# Patient Record
Sex: MALE | Race: WHITE | Employment: OTHER | URBAN - METROPOLITAN AREA
[De-identification: names, ages, dates, MRNs, and addresses within clinical notes are randomized per-mention and may not be internally consistent; named-entity substitution may affect disease eponyms.]

---

## 2017-03-22 PROBLEM — E07.9 THYROID DISEASE: Status: ACTIVE | Noted: 2017-03-22

## 2017-03-22 PROBLEM — K21.9 GERD (GASTROESOPHAGEAL REFLUX DISEASE): Status: ACTIVE | Noted: 2017-03-22

## 2017-03-22 PROBLEM — G43.909 MIGRAINES: Status: ACTIVE | Noted: 2017-03-22

## 2017-03-22 PROBLEM — F32.A DEPRESSION: Status: ACTIVE | Noted: 2017-03-22

## 2017-03-22 PROBLEM — F40.240 CLAUSTROPHOBIA: Status: ACTIVE | Noted: 2017-03-22

## 2017-03-22 PROBLEM — E78.00 HIGH CHOLESTEROL: Status: ACTIVE | Noted: 2017-03-22

## 2020-01-27 ENCOUNTER — HOSPITAL ENCOUNTER (INPATIENT)
Age: 50
LOS: 3 days | Discharge: HOME | DRG: 378 | End: 2020-01-30
Attending: EMERGENCY MEDICINE | Admitting: FAMILY MEDICINE
Payer: COMMERCIAL

## 2020-01-27 DIAGNOSIS — K92.2 ACUTE UPPER GI BLEEDING: Primary | ICD-10-CM

## 2020-01-27 PROBLEM — D64.9 ANEMIA: Status: ACTIVE | Noted: 2020-01-27

## 2020-01-27 PROBLEM — K92.1 MELENA: Status: ACTIVE | Noted: 2020-01-27

## 2020-01-27 LAB
ALBUMIN SERPL-MCNC: 3.3 G/DL (ref 3.5–5)
ALBUMIN/GLOB SERPL: 1.1 {RATIO} (ref 1.2–3.5)
ALP SERPL-CCNC: 109 U/L (ref 50–136)
ALT SERPL-CCNC: 37 U/L (ref 12–65)
ANION GAP SERPL CALC-SCNC: 5 MMOL/L (ref 7–16)
AST SERPL-CCNC: 37 U/L (ref 15–37)
BASOPHILS # BLD: 0.1 K/UL (ref 0–0.2)
BASOPHILS NFR BLD: 1 % (ref 0–2)
BILIRUB SERPL-MCNC: 0.5 MG/DL (ref 0.2–1.1)
BUN SERPL-MCNC: 14 MG/DL (ref 6–23)
CALCIUM SERPL-MCNC: 8.5 MG/DL (ref 8.3–10.4)
CHLORIDE SERPL-SCNC: 109 MMOL/L (ref 98–107)
CO2 SERPL-SCNC: 27 MMOL/L (ref 21–32)
CREAT SERPL-MCNC: 1.06 MG/DL (ref 0.8–1.5)
DIFFERENTIAL METHOD BLD: ABNORMAL
EOSINOPHIL # BLD: 0.2 K/UL (ref 0–0.8)
EOSINOPHIL NFR BLD: 3 % (ref 0.5–7.8)
ERYTHROCYTE [DISTWIDTH] IN BLOOD BY AUTOMATED COUNT: 17.2 % (ref 11.9–14.6)
GLOBULIN SER CALC-MCNC: 2.9 G/DL (ref 2.3–3.5)
GLUCOSE SERPL-MCNC: 86 MG/DL (ref 65–100)
HCT VFR BLD AUTO: 25.7 % (ref 41.1–50.3)
HGB BLD-MCNC: 7.8 G/DL (ref 13.6–17.2)
IMM GRANULOCYTES # BLD AUTO: 0 K/UL (ref 0–0.5)
IMM GRANULOCYTES NFR BLD AUTO: 0 % (ref 0–5)
LIPASE SERPL-CCNC: 94 U/L (ref 73–393)
LYMPHOCYTES # BLD: 2.8 K/UL (ref 0.5–4.6)
LYMPHOCYTES NFR BLD: 35 % (ref 13–44)
MCH RBC QN AUTO: 26.6 PG (ref 26.1–32.9)
MCHC RBC AUTO-ENTMCNC: 30.4 G/DL (ref 31.4–35)
MCV RBC AUTO: 87.7 FL (ref 79.6–97.8)
MONOCYTES # BLD: 0.6 K/UL (ref 0.1–1.3)
MONOCYTES NFR BLD: 7 % (ref 4–12)
NEUTS SEG # BLD: 4.3 K/UL (ref 1.7–8.2)
NEUTS SEG NFR BLD: 54 % (ref 43–78)
NRBC # BLD: 0 K/UL (ref 0–0.2)
PLATELET # BLD AUTO: 255 K/UL (ref 150–450)
PMV BLD AUTO: 11 FL (ref 9.4–12.3)
POTASSIUM SERPL-SCNC: 3.9 MMOL/L (ref 3.5–5.1)
PROT SERPL-MCNC: 6.2 G/DL (ref 6.3–8.2)
RBC # BLD AUTO: 2.93 M/UL (ref 4.23–5.6)
SODIUM SERPL-SCNC: 141 MMOL/L (ref 136–145)
WBC # BLD AUTO: 8 K/UL (ref 4.3–11.1)

## 2020-01-27 PROCEDURE — 80053 COMPREHEN METABOLIC PANEL: CPT

## 2020-01-27 PROCEDURE — 74011250636 HC RX REV CODE- 250/636: Performed by: EMERGENCY MEDICINE

## 2020-01-27 PROCEDURE — C9113 INJ PANTOPRAZOLE SODIUM, VIA: HCPCS | Performed by: EMERGENCY MEDICINE

## 2020-01-27 PROCEDURE — P9016 RBC LEUKOCYTES REDUCED: HCPCS

## 2020-01-27 PROCEDURE — 86900 BLOOD TYPING SEROLOGIC ABO: CPT

## 2020-01-27 PROCEDURE — 99218 HC RM OBSERVATION: CPT

## 2020-01-27 PROCEDURE — 65270000029 HC RM PRIVATE

## 2020-01-27 PROCEDURE — 83690 ASSAY OF LIPASE: CPT

## 2020-01-27 PROCEDURE — 99284 EMERGENCY DEPT VISIT MOD MDM: CPT

## 2020-01-27 PROCEDURE — 74011250636 HC RX REV CODE- 250/636: Performed by: FAMILY MEDICINE

## 2020-01-27 PROCEDURE — 86923 COMPATIBILITY TEST ELECTRIC: CPT

## 2020-01-27 PROCEDURE — 85025 COMPLETE CBC W/AUTO DIFF WBC: CPT

## 2020-01-27 PROCEDURE — 74011000250 HC RX REV CODE- 250: Performed by: EMERGENCY MEDICINE

## 2020-01-27 PROCEDURE — 30233N1 TRANSFUSION OF NONAUTOLOGOUS RED BLOOD CELLS INTO PERIPHERAL VEIN, PERCUTANEOUS APPROACH: ICD-10-PCS | Performed by: EMERGENCY MEDICINE

## 2020-01-27 PROCEDURE — 36430 TRANSFUSION BLD/BLD COMPNT: CPT

## 2020-01-27 PROCEDURE — 96374 THER/PROPH/DIAG INJ IV PUSH: CPT

## 2020-01-27 RX ORDER — ONDANSETRON 2 MG/ML
4 INJECTION INTRAMUSCULAR; INTRAVENOUS
Status: DISCONTINUED | OUTPATIENT
Start: 2020-01-27 | End: 2020-01-30 | Stop reason: HOSPADM

## 2020-01-27 RX ORDER — EPINEPHRINE 0.15 MG/.3ML
1 INJECTION INTRAMUSCULAR
COMMUNITY
End: 2020-01-30

## 2020-01-27 RX ORDER — HYDROCODONE BITARTRATE AND ACETAMINOPHEN 10; 325 MG/1; MG/1
1 TABLET ORAL
Status: DISCONTINUED | OUTPATIENT
Start: 2020-01-27 | End: 2020-01-30 | Stop reason: HOSPADM

## 2020-01-27 RX ORDER — PANTOPRAZOLE SODIUM 40 MG/1
40 TABLET, DELAYED RELEASE ORAL
COMMUNITY
Start: 2019-12-31 | End: 2020-01-30

## 2020-01-27 RX ORDER — SODIUM CHLORIDE 9 MG/ML
75 INJECTION, SOLUTION INTRAVENOUS CONTINUOUS
Status: DISCONTINUED | OUTPATIENT
Start: 2020-01-28 | End: 2020-01-30 | Stop reason: HOSPADM

## 2020-01-27 RX ORDER — LEVOTHYROXINE SODIUM 150 UG/1
150 TABLET ORAL
COMMUNITY
Start: 2019-10-31 | End: 2020-04-24 | Stop reason: CLARIF

## 2020-01-27 RX ORDER — SODIUM CHLORIDE 0.9 % (FLUSH) 0.9 %
5-40 SYRINGE (ML) INJECTION EVERY 8 HOURS
Status: DISCONTINUED | OUTPATIENT
Start: 2020-01-28 | End: 2020-01-27

## 2020-01-27 RX ORDER — TESTOSTERONE CYPIONATE 200 MG/ML
200 INJECTION INTRAMUSCULAR
COMMUNITY
End: 2020-01-30

## 2020-01-27 RX ORDER — ALBUTEROL SULFATE 90 UG/1
2 AEROSOL, METERED RESPIRATORY (INHALATION)
COMMUNITY
End: 2020-01-30

## 2020-01-27 RX ORDER — CYCLOBENZAPRINE HCL 10 MG
10 TABLET ORAL
COMMUNITY
End: 2020-01-30

## 2020-01-27 RX ORDER — CLONAZEPAM 0.5 MG/1
0.5 TABLET ORAL DAILY PRN
Status: DISCONTINUED | OUTPATIENT
Start: 2020-01-27 | End: 2020-01-30 | Stop reason: HOSPADM

## 2020-01-27 RX ORDER — CLONAZEPAM 0.5 MG/1
0.5 TABLET ORAL AS NEEDED
COMMUNITY

## 2020-01-27 RX ORDER — SODIUM CHLORIDE 0.9 % (FLUSH) 0.9 %
5-40 SYRINGE (ML) INJECTION AS NEEDED
Status: DISCONTINUED | OUTPATIENT
Start: 2020-01-27 | End: 2020-01-30 | Stop reason: HOSPADM

## 2020-01-27 RX ORDER — HYDROCODONE BITARTRATE AND ACETAMINOPHEN 10; 325 MG/1; MG/1
1 TABLET ORAL
COMMUNITY
Start: 2019-10-31 | End: 2020-01-30

## 2020-01-27 RX ORDER — SODIUM CHLORIDE 9 MG/ML
250 INJECTION, SOLUTION INTRAVENOUS AS NEEDED
Status: DISCONTINUED | OUTPATIENT
Start: 2020-01-27 | End: 2020-01-30 | Stop reason: HOSPADM

## 2020-01-27 RX ADMIN — SODIUM CHLORIDE 100 ML/HR: 900 INJECTION, SOLUTION INTRAVENOUS at 23:48

## 2020-01-27 RX ADMIN — SODIUM CHLORIDE 1000 ML: 900 INJECTION, SOLUTION INTRAVENOUS at 21:10

## 2020-01-27 RX ADMIN — SODIUM CHLORIDE 40 MG: 9 INJECTION, SOLUTION INTRAMUSCULAR; INTRAVENOUS; SUBCUTANEOUS at 20:43

## 2020-01-27 NOTE — LETTER
NOTIFICATION RETURN TO WORK / SCHOOL 
 
1/28/2020 12:45 PM 
 
Mr. Raelyn Seip [address] [facility] [address] To Whom It May Concern: 
 
Raelyn Seip is currently under the care of ANNI Stokes. He will return to work/school on: Undetermined at this time pending test results and treatments if needed. Admitted on 1/27/2020 If there are questions or concerns please have the patient contact our unit Sincerely, Kristie Tovar RN Case Manager

## 2020-01-27 NOTE — ED TRIAGE NOTES
Patient sent from PCP for low hemoglobin of 8.2. Patient states that has received 8 pints of blood this month. Patient is pale in triage. Reports SOB and \"slight chest pain. \"

## 2020-01-28 ENCOUNTER — ANESTHESIA EVENT (OUTPATIENT)
Dept: ENDOSCOPY | Age: 50
DRG: 378 | End: 2020-01-28
Payer: COMMERCIAL

## 2020-01-28 ENCOUNTER — ANESTHESIA (OUTPATIENT)
Dept: ENDOSCOPY | Age: 50
DRG: 378 | End: 2020-01-28
Payer: COMMERCIAL

## 2020-01-28 LAB
ANION GAP SERPL CALC-SCNC: 5 MMOL/L (ref 7–16)
BASOPHILS # BLD: 0 K/UL (ref 0–0.2)
BASOPHILS # BLD: 0 K/UL (ref 0–0.2)
BASOPHILS NFR BLD: 1 % (ref 0–2)
BASOPHILS NFR BLD: 1 % (ref 0–2)
BUN SERPL-MCNC: 14 MG/DL (ref 6–23)
CALCIUM SERPL-MCNC: 8 MG/DL (ref 8.3–10.4)
CHLORIDE SERPL-SCNC: 113 MMOL/L (ref 98–107)
CO2 SERPL-SCNC: 26 MMOL/L (ref 21–32)
CREAT SERPL-MCNC: 0.95 MG/DL (ref 0.8–1.5)
DIFFERENTIAL METHOD BLD: ABNORMAL
DIFFERENTIAL METHOD BLD: ABNORMAL
EOSINOPHIL # BLD: 0.2 K/UL (ref 0–0.8)
EOSINOPHIL # BLD: 0.2 K/UL (ref 0–0.8)
EOSINOPHIL NFR BLD: 3 % (ref 0.5–7.8)
EOSINOPHIL NFR BLD: 4 % (ref 0.5–7.8)
ERYTHROCYTE [DISTWIDTH] IN BLOOD BY AUTOMATED COUNT: 16.5 % (ref 11.9–14.6)
ERYTHROCYTE [DISTWIDTH] IN BLOOD BY AUTOMATED COUNT: 16.8 % (ref 11.9–14.6)
GLUCOSE SERPL-MCNC: 120 MG/DL (ref 65–100)
HCT VFR BLD AUTO: 24.5 % (ref 41.1–50.3)
HCT VFR BLD AUTO: 27 % (ref 41.1–50.3)
HGB BLD-MCNC: 7.5 G/DL (ref 13.6–17.2)
HGB BLD-MCNC: 8.2 G/DL (ref 13.6–17.2)
IMM GRANULOCYTES # BLD AUTO: 0 K/UL (ref 0–0.5)
IMM GRANULOCYTES # BLD AUTO: 0 K/UL (ref 0–0.5)
IMM GRANULOCYTES NFR BLD AUTO: 0 % (ref 0–5)
IMM GRANULOCYTES NFR BLD AUTO: 0 % (ref 0–5)
INR PPP: 1
LYMPHOCYTES # BLD: 1.8 K/UL (ref 0.5–4.6)
LYMPHOCYTES # BLD: 2.2 K/UL (ref 0.5–4.6)
LYMPHOCYTES NFR BLD: 40 % (ref 13–44)
LYMPHOCYTES NFR BLD: 41 % (ref 13–44)
MCH RBC QN AUTO: 26.5 PG (ref 26.1–32.9)
MCH RBC QN AUTO: 26.6 PG (ref 26.1–32.9)
MCHC RBC AUTO-ENTMCNC: 30.4 G/DL (ref 31.4–35)
MCHC RBC AUTO-ENTMCNC: 30.6 G/DL (ref 31.4–35)
MCV RBC AUTO: 86.6 FL (ref 79.6–97.8)
MCV RBC AUTO: 87.7 FL (ref 79.6–97.8)
MONOCYTES # BLD: 0.3 K/UL (ref 0.1–1.3)
MONOCYTES # BLD: 0.4 K/UL (ref 0.1–1.3)
MONOCYTES NFR BLD: 6 % (ref 4–12)
MONOCYTES NFR BLD: 7 % (ref 4–12)
NEUTS SEG # BLD: 2.3 K/UL (ref 1.7–8.2)
NEUTS SEG # BLD: 2.6 K/UL (ref 1.7–8.2)
NEUTS SEG NFR BLD: 48 % (ref 43–78)
NEUTS SEG NFR BLD: 50 % (ref 43–78)
NRBC # BLD: 0 K/UL (ref 0–0.2)
NRBC # BLD: 0 K/UL (ref 0–0.2)
PLATELET # BLD AUTO: 212 K/UL (ref 150–450)
PLATELET # BLD AUTO: 227 K/UL (ref 150–450)
PMV BLD AUTO: 10.4 FL (ref 9.4–12.3)
PMV BLD AUTO: 10.5 FL (ref 9.4–12.3)
POTASSIUM SERPL-SCNC: 3.9 MMOL/L (ref 3.5–5.1)
PROTHROMBIN TIME: 13.4 SEC (ref 11.7–14.5)
RBC # BLD AUTO: 2.83 M/UL (ref 4.23–5.6)
RBC # BLD AUTO: 3.08 M/UL (ref 4.23–5.6)
SODIUM SERPL-SCNC: 144 MMOL/L (ref 136–145)
WBC # BLD AUTO: 4.6 K/UL (ref 4.3–11.1)
WBC # BLD AUTO: 5.5 K/UL (ref 4.3–11.1)

## 2020-01-28 PROCEDURE — 65270000029 HC RM PRIVATE

## 2020-01-28 PROCEDURE — 36415 COLL VENOUS BLD VENIPUNCTURE: CPT

## 2020-01-28 PROCEDURE — 77030021593 HC FCPS BIOP ENDOSC BSC -A: Performed by: INTERNAL MEDICINE

## 2020-01-28 PROCEDURE — 88305 TISSUE EXAM BY PATHOLOGIST: CPT

## 2020-01-28 PROCEDURE — 85610 PROTHROMBIN TIME: CPT

## 2020-01-28 PROCEDURE — 80048 BASIC METABOLIC PNL TOTAL CA: CPT

## 2020-01-28 PROCEDURE — 0DB98ZX EXCISION OF DUODENUM, VIA NATURAL OR ARTIFICIAL OPENING ENDOSCOPIC, DIAGNOSTIC: ICD-10-PCS | Performed by: INTERNAL MEDICINE

## 2020-01-28 PROCEDURE — 74011250637 HC RX REV CODE- 250/637: Performed by: FAMILY MEDICINE

## 2020-01-28 PROCEDURE — 99218 HC RM OBSERVATION: CPT

## 2020-01-28 PROCEDURE — 76060000031 HC ANESTHESIA FIRST 0.5 HR: Performed by: INTERNAL MEDICINE

## 2020-01-28 PROCEDURE — 88312 SPECIAL STAINS GROUP 1: CPT

## 2020-01-28 PROCEDURE — 74011250636 HC RX REV CODE- 250/636: Performed by: INTERNAL MEDICINE

## 2020-01-28 PROCEDURE — C9113 INJ PANTOPRAZOLE SODIUM, VIA: HCPCS | Performed by: FAMILY MEDICINE

## 2020-01-28 PROCEDURE — 0DB68ZX EXCISION OF STOMACH, VIA NATURAL OR ARTIFICIAL OPENING ENDOSCOPIC, DIAGNOSTIC: ICD-10-PCS | Performed by: INTERNAL MEDICINE

## 2020-01-28 PROCEDURE — 77030022875 HC PRB AR PLSM COAG ERBE -C: Performed by: INTERNAL MEDICINE

## 2020-01-28 PROCEDURE — 76040000025: Performed by: INTERNAL MEDICINE

## 2020-01-28 PROCEDURE — 74011000250 HC RX REV CODE- 250: Performed by: NURSE ANESTHETIST, CERTIFIED REGISTERED

## 2020-01-28 PROCEDURE — 74011250637 HC RX REV CODE- 250/637: Performed by: INTERNAL MEDICINE

## 2020-01-28 PROCEDURE — 74011250636 HC RX REV CODE- 250/636: Performed by: NURSE ANESTHETIST, CERTIFIED REGISTERED

## 2020-01-28 PROCEDURE — 85025 COMPLETE CBC W/AUTO DIFF WBC: CPT

## 2020-01-28 PROCEDURE — 74011250636 HC RX REV CODE- 250/636: Performed by: FAMILY MEDICINE

## 2020-01-28 PROCEDURE — 74011000250 HC RX REV CODE- 250: Performed by: FAMILY MEDICINE

## 2020-01-28 RX ORDER — PROPOFOL 10 MG/ML
INJECTION, EMULSION INTRAVENOUS
Status: DISCONTINUED | OUTPATIENT
Start: 2020-01-28 | End: 2020-01-28 | Stop reason: HOSPADM

## 2020-01-28 RX ORDER — PANTOPRAZOLE SODIUM 40 MG/1
40 TABLET, DELAYED RELEASE ORAL
Status: DISCONTINUED | OUTPATIENT
Start: 2020-01-28 | End: 2020-01-30 | Stop reason: HOSPADM

## 2020-01-28 RX ORDER — SODIUM CHLORIDE, SODIUM LACTATE, POTASSIUM CHLORIDE, CALCIUM CHLORIDE 600; 310; 30; 20 MG/100ML; MG/100ML; MG/100ML; MG/100ML
1000 INJECTION, SOLUTION INTRAVENOUS CONTINUOUS
Status: DISCONTINUED | OUTPATIENT
Start: 2020-01-28 | End: 2020-01-28 | Stop reason: HOSPADM

## 2020-01-28 RX ORDER — LIDOCAINE HYDROCHLORIDE 20 MG/ML
INJECTION, SOLUTION EPIDURAL; INFILTRATION; INTRACAUDAL; PERINEURAL AS NEEDED
Status: DISCONTINUED | OUTPATIENT
Start: 2020-01-28 | End: 2020-01-28 | Stop reason: HOSPADM

## 2020-01-28 RX ORDER — SUCRALFATE 1 G/1
1 TABLET ORAL
Status: DISCONTINUED | OUTPATIENT
Start: 2020-01-28 | End: 2020-01-30 | Stop reason: HOSPADM

## 2020-01-28 RX ORDER — PROPOFOL 10 MG/ML
INJECTION, EMULSION INTRAVENOUS AS NEEDED
Status: DISCONTINUED | OUTPATIENT
Start: 2020-01-28 | End: 2020-01-28 | Stop reason: HOSPADM

## 2020-01-28 RX ADMIN — PROPOFOL 200 MCG/KG/MIN: 10 INJECTION, EMULSION INTRAVENOUS at 10:35

## 2020-01-28 RX ADMIN — HYDROCODONE BITARTRATE AND ACETAMINOPHEN 1 TABLET: 10; 325 TABLET ORAL at 23:17

## 2020-01-28 RX ADMIN — LIDOCAINE HYDROCHLORIDE 100 MG: 20 INJECTION, SOLUTION EPIDURAL; INFILTRATION; INTRACAUDAL; PERINEURAL at 10:35

## 2020-01-28 RX ADMIN — SUCRALFATE 1 G: 1 TABLET ORAL at 17:10

## 2020-01-28 RX ADMIN — SODIUM CHLORIDE 40 MG: 9 INJECTION, SOLUTION INTRAMUSCULAR; INTRAVENOUS; SUBCUTANEOUS at 09:04

## 2020-01-28 RX ADMIN — PANTOPRAZOLE SODIUM 40 MG: 40 TABLET, DELAYED RELEASE ORAL at 17:10

## 2020-01-28 RX ADMIN — SUCRALFATE 1 G: 1 TABLET ORAL at 21:14

## 2020-01-28 RX ADMIN — SODIUM CHLORIDE, SODIUM LACTATE, POTASSIUM CHLORIDE, AND CALCIUM CHLORIDE 1000 ML: 600; 310; 30; 20 INJECTION, SOLUTION INTRAVENOUS at 10:15

## 2020-01-28 RX ADMIN — PROPOFOL 70 MG: 10 INJECTION, EMULSION INTRAVENOUS at 10:35

## 2020-01-28 RX ADMIN — ONDANSETRON 4 MG: 2 INJECTION INTRAMUSCULAR; INTRAVENOUS at 21:14

## 2020-01-28 NOTE — H&P (VIEW-ONLY)
[facility] Consult Note Referring Provider:  Lyndon Douglas 
 
Consult Date:  1/27/2020 Admit Date:  1/27/2020 Chief Complaint: Anemia Subjective:  
 
History of Present Illness:  Patient is a 52 y.o. male with PMH of Depression, chronic pain, who is seen in consultation at the request of Dr. Laura Dobbins  for anemia. He was placed on high dose NSAIDs in November for presumed LLQ cellulitis. He presented to the [facility] ER on 12/30 for weakness and was found to have a decreased hgb. He underwnet EGD and was told he had [employer]"  He has since been admitted an additional time for anemia. He was last transfused 2u PRBC 3 days ago. He followed up with the surgeon who did his endoscopies today. He directed him to [employer] \"since there was nothing more he could do\". The patient endorses 2 dark tarry stools daily. There has been no BRB. There is diffuse epigastric pain. He has not used NSAIDs since his initial endoscopy. PMH: 
Past Medical History:  
Diagnosis Date  Claustrophobia 3/22/2017  Depression 3/22/2017  GERD (gastroesophageal reflux disease) 3/22/2017  High cholesterol 3/22/2017  
 HNP (herniated nucleus pulposus), lumbar  Lumbago  Lumbar radiculopathy  Migraines 3/22/2017  Prostate disease  Scoliosis (and kyphoscoliosis), idiopathic  Thyroid disease 3/22/2017 PSH: 
Past Surgical History:  
Procedure Laterality Date  HX BACK SURGERY    
 HX HEENT    
 SINUS SURGERY  
 HX ORCHIECTOMY POSTATE DISEASE / UNILATERAL  
 HX TONSILLECTOMY Allergies: Allergies Allergen Reactions  Levaquin [Levofloxacin] Unknown (comments) FLUOROQUINOLONES   
 Prednisone Unknown (comments) CORTICOSTEROIDS LIQUID PREDNISONE Home Medications: 
Prior to Admission medications Medication Sig Start Date End Date Taking? Authorizing Provider HYDROcodone-acetaminophen (NORCO)  mg tablet Take 1 Tab by mouth. 10/31/19 2/26/20 Yes Other, MD Frandy  
pantoprazole (PROTONIX) 40 mg tablet Take 40 mg by mouth. 12/31/19 1/30/20 Yes Alma, MD Frandy  
levothyroxine (SYNTHROID) 137 mcg tablet Take 137 mcg by mouth. 10/31/19 6/30/20 Yes Alma, MD Frandy  
EPINEPHrine (EPIPEN JR) 0.15 mg/0.3 mL injection 1 Dose. Other, MD Frandy  
albuterol (PROVENTIL HFA, VENTOLIN HFA, PROAIR HFA) 90 mcg/actuation inhaler Take 2 Puffs by inhalation. Other, MD Frandy  
testosterone cypionate (DEPOTESTOTERONE CYPIONATE) 200 mg/mL injection 200 mg by IntraMUSCular route. Other, MD Frandy  
clonazePAM (KLONOPIN) 0.5 mg tablet Take 0.5 mg by mouth. Other, MD Frandy  
cyclobenzaprine (FLEXERIL) 10 mg tablet Take 10 mg by mouth. Other, MD Frandy  
CLONAZEPAM PO Take  by mouth. Provider, Historical  
CYCLOBENZAPRINE HCL (CYCLOBENZAPRINE PO) Take  by mouth. Provider, Historical  
GABAPENTIN PO Take  by mouth. Provider, Historical  
OTHER Take  by mouth. Indications: HYDROCODONE    Provider, Historical  
OXYCODONE HCL (OXYCODONE PO) Take  by mouth. Provider, Historical  
LEVOTHYROXINE SODIUM (SYNTHROID PO) Take  by mouth. Provider, Historical  
 
 
Hospital Medications: 
Current Facility-Administered Medications Medication Dose Route Frequency  sodium chloride 0.9 % bolus infusion 1,000 mL  1,000 mL IntraVENous ONCE  
 0.9% sodium chloride infusion 250 mL  250 mL IntraVENous PRN Current Outpatient Medications Medication Sig  
 HYDROcodone-acetaminophen (NORCO)  mg tablet Take 1 Tab by mouth.  pantoprazole (PROTONIX) 40 mg tablet Take 40 mg by mouth.  levothyroxine (SYNTHROID) 137 mcg tablet Take 137 mcg by mouth.  EPINEPHrine (EPIPEN JR) 0.15 mg/0.3 mL injection 1 Dose.  albuterol (PROVENTIL HFA, VENTOLIN HFA, PROAIR HFA) 90 mcg/actuation inhaler Take 2 Puffs by inhalation.  testosterone cypionate (DEPOTESTOTERONE CYPIONATE) 200 mg/mL injection 200 mg by IntraMUSCular route.  clonazePAM (KLONOPIN) 0.5 mg tablet Take 0.5 mg by mouth.  cyclobenzaprine (FLEXERIL) 10 mg tablet Take 10 mg by mouth.  CLONAZEPAM PO Take  by mouth.  CYCLOBENZAPRINE HCL (CYCLOBENZAPRINE PO) Take  by mouth.  GABAPENTIN PO Take  by mouth.  OTHER Take  by mouth. Indications: HYDROCODONE  
 OXYCODONE HCL (OXYCODONE PO) Take  by mouth.  LEVOTHYROXINE SODIUM (SYNTHROID PO) Take  by mouth. Social History: 
Social History Tobacco Use  Smoking status: Current Every Day Smoker Packs/day: 1.00 Years: 1.00 Pack years: 1.00  Tobacco comment: TOBACCO ABUSE Substance Use Topics  Alcohol use: No  
 
 
Pt denies any history of drug use, blood transfusions, or tattoos. Family History: 
Family History Problem Relation Age of Onset  Hypertension Other  Other Other BRAIN ANEURYSM  
 Stroke Other Review of Systems: A detailed 10 system ROS is obtained, with pertinent positives as listed above. All others are negative. Diet:   
 
Objective:  
 
Physical Exam: 
Vitals: 
Visit Vitals /88 (BP 1 Location: Right arm, BP Patient Position: At rest) Pulse 78 Temp 98.1 °F (36.7 °C) Resp 18 Ht 5' 10\" (1.778 m) Wt 93.4 kg (206 lb) SpO2 99% BMI 29.56 kg/m² Gen:  Pt is alert, cooperative, no acute distress Skin:  Extremities and face reveal no rashes. HEENT: Sclerae anicteric. Extra-occular muscles are intact. No oral ulcers. No abnormal pigmentation of the lips. The neck is supple. Cardiovascular: Regular rate and rhythm. No murmurs, gallops, or rubs. Respiratory:  Comfortable breathing with no accessory muscle use. Clear breath sounds anteriorly with no wheezes, rales, or rhonchi. GI:  Abdomen nondistended, soft, and nontender. Normal active bowel sounds. No enlargement of the liver or spleen. No masses palpable. Rectal:  Deferred Musculoskeletal:  No pitting edema of the lower legs. Neurological:  Gross memory appears intact. Patient is alert and oriented. Psychiatric:  Mood appears appropriate with judgement intact. Lymphatic:  No cervical or supraclavicular adenopathy. Laboratory:   
Recent Labs  
  01/27/20 
1802 WBC 8.0 HGB 7.8* HCT 25.7*  
 MCV 87.7   
K 3.9 * CO2 27 BUN 14  
CREA 1.06  
CA 8.5 GLU 86  SGOT 37 ALT 37 TBILI 0.5 ALB 3.3* TP 6.2*  
LPSE 94 Assessment:  
 
Principal Problem: 
  Melena (1/27/2020) Active Problems: 
  Thyroid disease (3/22/2017) Overview: HYPOTHYROIDISM Depression (3/22/2017) GERD (gastroesophageal reflux disease) (3/22/2017) Anemia (1/27/2020) GI bleed (1/27/2020) Plan:  
 
GI Bleeding-  I personally reviewed his endoscopy images which are more suggestive of G.A.V. E than non-steroidal gastropathy. I agree with transfusion. It is doubtful that a bleeding scan will localize. We will plan on EGD in am.  In the interim IV PPI.

## 2020-01-28 NOTE — PERIOP NOTES
TRANSFER - OUT REPORT:    Verbal report given to Christian Delcid RN (name) on Chandrakant Verma  being transferred to room 522 (unit) for routine progression of care       Report consisted of patients Situation, Background, Assessment and   Recommendations(SBAR). Information from the following report(s) SBAR, Kardex and Procedure Summary was reviewed with the receiving nurse. Lines:   Peripheral IV 01/27/20 Left Antecubital (Active)   Site Assessment Clean, dry, & intact 1/28/2020  2:50 AM   Phlebitis Assessment 0 1/28/2020  2:50 AM   Infiltration Assessment 0 1/28/2020  2:50 AM   Dressing Status Clean, dry, & intact; Occlusive 1/28/2020  2:50 AM   Dressing Type Tape;Transparent 1/28/2020  2:50 AM   Hub Color/Line Status Infusing 1/28/2020  2:50 AM        Opportunity for questions and clarification was provided.       Patient transported with:   O2 @ 2 liters  Tech

## 2020-01-28 NOTE — INTERVAL H&P NOTE
H&P Update: 
Elayne Rubinstein was seen and examined. History and physical has been reviewed. The patient has been examined.  There have been no significant clinical changes since the completion of the originally dated History and Physical.

## 2020-01-28 NOTE — PROGRESS NOTES
Progress Note    Patient: Digna Flood MRN: [id number]  SSN: [id number]    YOB: 1970  Age: 52 y.o. Sex: male      Admit Date: 1/27/2020    LOS: 0 days     Subjective:     PMH of GI bleeding. S/P EGD twice and colonoscopy. He was told that he had 9 polyps in the colon. They were all removed. For EGD he was told there was no evidence of sources of bleeding. Admitted this time because of passing dark-colored stool. He just went to EGD this morning. Found to have [address] patient denies any other bleeding per rectum since admission. No hematemesis. No fever. No shaking. No chills. No shortness of breath. Objective:     Vitals:    01/28/20 1107 01/28/20 1117 01/28/20 1127 01/28/20 1209   BP: 91/46 109/54 112/63 132/72   Pulse: 69 73 71 66   Resp: 16 14 16 18   Temp:    97.8 °F (36.6 °C)   SpO2: 98% 98% 99% 98%   Weight:       Height:            Intake and Output:  Current Shift: 01/28 0701 - 01/28 1900  In: 2923 [I.V.:1289]  Out: 0   Last three shifts: 01/26 1901 - 01/28 0700  In: 18 [P.O.:480]  Out: -     Physical Exam:     General:                    The patient is a pleasant middle aged male in no acute distress. Head:                                   Normocephalic/atraumatic. Eyes:                                   palpebral pallor, no scleral icterus. ENT:                                    External auricular and nasal exam within normal limits. Mucous membranes are moist.  Neck:                                   Supple, non-tender, no JVD. Lungs:                       Clear to auscultation bilaterally without wheezes or crackles. No respiratory distress or accessory muscle use. Heart:                                  Regular rate and rhythm, without murmurs, rubs, or gallops. Abdomen:                  Soft, non-tender, non-distended with normoactive bowel sounds. Genitourinary:           No tenderness over the bladder or bilateral CVAs. Extremities:               Without clubbing, cyanosis, or edema. Skin:                                    Normal color, texture, and turgor. No rashes, lesions, or jaundice. Pulses:                      Radial and dorsalis pedis pulses present 2+ bilaterally. Capillary refill <2s. Neurologic:                CN II-XII grossly intact and symmetrical.                                               Moving all four extremities well with no focal deficits. Psychiatric:                Pleasant demeanor, appropriate affect. Alert and oriented x 3      Lab/Data Review:    Recent Results (from the past 24 hour(s))   TYPE & SCREEN    Collection Time: 01/27/20  6:02 PM   Result Value Ref Range    Crossmatch Expiration 01/30/2020     ABO/Rh(D) Danea Selin POSITIVE     Antibody screen NEG     Unit number [id number]     Blood component type  LR     Unit division 00     Status of unit TRANSFUSED     Crossmatch result Compatible    CBC WITH AUTOMATED DIFF    Collection Time: 01/27/20  6:02 PM   Result Value Ref Range    WBC 8.0 4.3 - 11.1 K/uL    RBC 2.93 (L) 4.23 - 5.6 M/uL    HGB 7.8 (L) 13.6 - 17.2 g/dL    HCT 25.7 (L) 41.1 - 50.3 %    MCV 87.7 79.6 - 97.8 FL    MCH 26.6 26.1 - 32.9 PG    MCHC 30.4 (L) 31.4 - 35.0 g/dL    RDW 17.2 (H) 11.9 - 14.6 %    PLATELET 385 695 - 307 K/uL    MPV 11.0 9.4 - 12.3 FL    ABSOLUTE NRBC 0.00 0.0 - 0.2 K/uL    DF AUTOMATED      NEUTROPHILS 54 43 - 78 %    LYMPHOCYTES 35 13 - 44 %    MONOCYTES 7 4.0 - 12.0 %    EOSINOPHILS 3 0.5 - 7.8 %    BASOPHILS 1 0.0 - 2.0 %    IMMATURE GRANULOCYTES 0 0.0 - 5.0 %    ABS. NEUTROPHILS 4.3 1.7 - 8.2 K/UL    ABS. LYMPHOCYTES 2.8 0.5 - 4.6 K/UL    ABS. MONOCYTES 0.6 0.1 - 1.3 K/UL    ABS. EOSINOPHILS 0.2 0.0 - 0.8 K/UL    ABS. BASOPHILS 0.1 0.0 - 0.2 K/UL    ABS. IMM.  GRANS. 0.0 0.0 - 0.5 K/UL   METABOLIC PANEL, COMPREHENSIVE    Collection Time: 01/27/20  6:02 PM   Result Value Ref Range    Sodium 141 136 - 145 mmol/L    Potassium 3.9 3.5 - 5.1 mmol/L    Chloride 109 (H) 98 - 107 mmol/L    CO2 27 21 - 32 mmol/L    Anion gap 5 (L) 7 - 16 mmol/L    Glucose 86 65 - 100 mg/dL    BUN 14 6 - 23 MG/DL    Creatinine 1.06 0.8 - 1.5 MG/DL    GFR est AA >60 >60 ml/min/1.73m2    GFR est non-AA >60 >60 ml/min/1.73m2    Calcium 8.5 8.3 - 10.4 MG/DL    Bilirubin, total 0.5 0.2 - 1.1 MG/DL    ALT (SGPT) 37 12 - 65 U/L    AST (SGOT) 37 15 - 37 U/L    Alk. phosphatase 109 50 - 136 U/L    Protein, total 6.2 (L) 6.3 - 8.2 g/dL    Albumin 3.3 (L) 3.5 - 5.0 g/dL    Globulin 2.9 2.3 - 3.5 g/dL    A-G Ratio 1.1 (L) 1.2 - 3.5     LIPASE    Collection Time: 01/27/20  6:02 PM   Result Value Ref Range    Lipase 94 73 - 275 U/L   METABOLIC PANEL, BASIC    Collection Time: 01/28/20  3:44 AM   Result Value Ref Range    Sodium 144 136 - 145 mmol/L    Potassium 3.9 3.5 - 5.1 mmol/L    Chloride 113 (H) 98 - 107 mmol/L    CO2 26 21 - 32 mmol/L    Anion gap 5 (L) 7 - 16 mmol/L    Glucose 120 (H) 65 - 100 mg/dL    BUN 14 6 - 23 MG/DL    Creatinine 0.95 0.8 - 1.5 MG/DL    GFR est AA >60 >60 ml/min/1.73m2    GFR est non-AA >60 >60 ml/min/1.73m2    Calcium 8.0 (L) 8.3 - 10.4 MG/DL   CBC WITH AUTOMATED DIFF    Collection Time: 01/28/20  3:44 AM   Result Value Ref Range    WBC 5.5 4.3 - 11.1 K/uL    RBC 2.83 (L) 4.23 - 5.6 M/uL    HGB 7.5 (L) 13.6 - 17.2 g/dL    HCT 24.5 (L) 41.1 - 50.3 %    MCV 86.6 79.6 - 97.8 FL    MCH 26.5 26.1 - 32.9 PG    MCHC 30.6 (L) 31.4 - 35.0 g/dL    RDW 16.5 (H) 11.9 - 14.6 %    PLATELET 800 854 - 070 K/uL    MPV 10.5 9.4 - 12.3 FL    ABSOLUTE NRBC 0.00 0.0 - 0.2 K/uL    DF AUTOMATED      NEUTROPHILS 48 43 - 78 %    LYMPHOCYTES 41 13 - 44 %    MONOCYTES 7 4.0 - 12.0 %    EOSINOPHILS 4 0.5 - 7.8 %    BASOPHILS 1 0.0 - 2.0 %    IMMATURE GRANULOCYTES 0 0.0 - 5.0 %    ABS. NEUTROPHILS 2.6 1.7 - 8.2 K/UL    ABS. LYMPHOCYTES 2.2 0.5 - 4.6 K/UL    ABS.  MONOCYTES 0.4 0.1 - 1.3 K/UL    ABS. EOSINOPHILS 0.2 0.0 - 0.8 K/UL    ABS. BASOPHILS 0.0 0.0 - 0.2 K/UL    ABS. IMM. GRANS. 0.0 0.0 - 0.5 K/UL   PROTHROMBIN TIME + INR    Collection Time: 01/28/20  8:35 AM   Result Value Ref Range    Prothrombin time 13.4 11.7 - 14.5 sec    INR 1.0     CBC WITH AUTOMATED DIFF    Collection Time: 01/28/20  1:22 PM   Result Value Ref Range    WBC 4.6 4.3 - 11.1 K/uL    RBC 3.08 (L) 4.23 - 5.6 M/uL    HGB 8.2 (L) 13.6 - 17.2 g/dL    HCT 27.0 (L) 41.1 - 50.3 %    MCV 87.7 79.6 - 97.8 FL    MCH 26.6 26.1 - 32.9 PG    MCHC 30.4 (L) 31.4 - 35.0 g/dL    RDW 16.8 (H) 11.9 - 14.6 %    PLATELET 859 208 - 328 K/uL    MPV 10.4 9.4 - 12.3 FL    ABSOLUTE NRBC 0.00 0.0 - 0.2 K/uL    DF AUTOMATED      NEUTROPHILS 50 43 - 78 %    LYMPHOCYTES 40 13 - 44 %    MONOCYTES 6 4.0 - 12.0 %    EOSINOPHILS 3 0.5 - 7.8 %    BASOPHILS 1 0.0 - 2.0 %    IMMATURE GRANULOCYTES 0 0.0 - 5.0 %    ABS. NEUTROPHILS 2.3 1.7 - 8.2 K/UL    ABS. LYMPHOCYTES 1.8 0.5 - 4.6 K/UL    ABS. MONOCYTES 0.3 0.1 - 1.3 K/UL    ABS. EOSINOPHILS 0.2 0.0 - 0.8 K/UL    ABS. BASOPHILS 0.0 0.0 - 0.2 K/UL    ABS. IMM. GRANS. 0.0 0.0 - 0.5 K/UL     EGD findings   1-  Stomach: Severe erythema, and mucosal edema, vascular ectasias in the gastric antrum. 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 A few tiny gastric vascular ectasias in the proximal stomach. Biopsies obtained. ABC was performed in the antrum at 20 kong, 0.8 L and circumferential probe. No evidence of complications. Duodenum:   Nodular, patchy erythema and friability, mild scalloping of the folds. Changes suspicious for celiac disease. Biopsies obtained. No current facility-administered medications on file prior to encounter. Current Outpatient Medications on File Prior to Encounter   Medication Sig Dispense Refill    HYDROcodone-acetaminophen (NORCO)  mg tablet Take 1 Tab by mouth.  pantoprazole (PROTONIX) 40 mg tablet Take 40 mg by mouth.       clonazePAM (KLONOPIN) 0.5 mg tablet Take 0.5 mg by mouth.  levothyroxine (SYNTHROID) 137 mcg tablet Take 137 mcg by mouth.  EPINEPHrine (EPIPEN JR) 0.15 mg/0.3 mL injection 1 Dose.  albuterol (PROVENTIL HFA, VENTOLIN HFA, PROAIR HFA) 90 mcg/actuation inhaler Take 2 Puffs by inhalation.  testosterone cypionate (DEPOTESTOTERONE CYPIONATE) 200 mg/mL injection 200 mg by IntraMUSCular route.  cyclobenzaprine (FLEXERIL) 10 mg tablet Take 10 mg by mouth.  CLONAZEPAM PO Take  by mouth.  CYCLOBENZAPRINE HCL (CYCLOBENZAPRINE PO) Take  by mouth.  GABAPENTIN PO Take  by mouth.  OXYCODONE HCL (OXYCODONE PO) Take  by mouth.  LEVOTHYROXINE SODIUM (SYNTHROID PO) Take  by mouth.          Current Facility-Administered Medications:     pantoprazole (PROTONIX) tablet 40 mg, 40 mg, Oral, ACB&D, Maurisio Sanders MD    sucralfate (CARAFATE) tablet 1 g, 1 g, Oral, AC&HS, Maurisio Sanders MD    0.9% sodium chloride infusion 250 mL, 250 mL, IntraVENous, PRN, Janeth High MD    clonazePAM (KlonoPIN) tablet 0.5 mg, 0.5 mg, Oral, DAILY PRN, Jennifer Hemphill MD    HYDROcodone-acetaminophen (NORCO)  mg tablet 1 Tab, 1 Tab, Oral, Q4H PRN, Jennifer Hemphill MD    levothyroxine (SYNTHROID) tablet 137 mcg, 137 mcg, Oral, 6am, Chris Walter MD, Stopped at 01/28/20 0600    0.9% sodium chloride infusion, 100 mL/hr, IntraVENous, CONTINUOUS, Tiago HUTTON MD, Last Rate: 100 mL/hr at 01/27/20 2348, 100 mL/hr at 01/27/20 2348    sodium chloride (NS) flush 5-40 mL, 5-40 mL, IntraVENous, PRN, Jennifer Hemphill MD    ondansetron [facility]) injection 4 mg, 4 mg, IntraVENous, Q4H PRN, Jennifer Hemphill MD      Assessment:     Principal Problem:    GI bleed (1/27/2020)    Active Problems:    Thyroid disease (3/22/2017)      Overview: HYPOTHYROIDISM      Depression (3/22/2017)      GERD (gastroesophageal reflux disease) (3/22/2017)      Anemia (1/27/2020)        Plan:     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   Continue PPI and Carafate  Advance diet  Monitor symptoms  Monitor his hemoglobin  May need transfusion    Patient advised not to smoke, not to abuse alcohol or    Patient will go for ultrasound of the abdomen tomorrow to assess liver, splenic vein, evidence of portal hypertension. Hypothyroidism   Continue home medications. I have discussed the plan of care with patient and spouse at bedside.     DVT prophylaxis : SCD     Signed By: Vaughan Dubin, MD     January 28, 2020

## 2020-01-28 NOTE — PROGRESS NOTES
EOS: Received 1 unit RBC's last night. Hgb went from 7.8 to 7.5 after RBC's. No s/s of active bleeding. Has been NPO since MN for EGD. Continue with POC. Report to oncoming RN.

## 2020-01-28 NOTE — CONSULTS
[facility] Consult Note           Referring Provider:  Neptali Flores    Consult Date:  1/27/2020    Admit Date:  1/27/2020    Chief Complaint: Anemia    Subjective:     History of Present Illness:  Patient is a [place] y.o. male with PMH of Depression, chronic pain, who is seen in consultation at the request of Dr. Brittani Caceres  for anemia. He was placed on high dose NSAIDs in November for presumed LLQ cellulitis. He presented to the [facility] ER on 12/30 for weakness and was found to have a decreased hgb. He underwnet EGD and was told he had [employer]"  He has since been admitted an additional time for anemia. He was last transfused 2u PRBC 3 days ago. He followed up with the surgeon who did his endoscopies today. He directed him to [facility] \"since there was nothing more he could do\". The patient endorses 2 dark tarry stools daily. There has been no BRB. There is diffuse epigastric pain. He has not used NSAIDs since his initial endoscopy. PMH:  Past Medical History:   Diagnosis Date    Claustrophobia 3/22/2017    Depression 3/22/2017    GERD (gastroesophageal reflux disease) 3/22/2017    High cholesterol 3/22/2017    HNP (herniated nucleus pulposus), lumbar     Lumbago     Lumbar radiculopathy     Migraines 3/22/2017    Prostate disease     Scoliosis (and kyphoscoliosis), idiopathic     Thyroid disease 3/22/2017       PSH:  Past Surgical History:   Procedure Laterality Date    HX BACK SURGERY      HX HEENT      SINUS SURGERY    HX ORCHIECTOMY      POSTATE DISEASE / UNILATERAL    HX TONSILLECTOMY         Allergies: Allergies   Allergen Reactions    Levaquin [Levofloxacin] Unknown (comments)     FLUOROQUINOLONES     Prednisone Unknown (comments)     CORTICOSTEROIDS LIQUID PREDNISONE       Home Medications:  Prior to Admission medications    Medication Sig Start Date End Date Taking? Authorizing Provider   HYDROcodone-acetaminophen (NORCO)  mg tablet Take 1 Tab by mouth. 10/31/19 2/26/20 Yes Alma, MD Frandy   pantoprazole (PROTONIX) 40 mg tablet Take 40 mg by mouth. 12/31/19 1/30/20 Yes Alma, MD rFandy   levothyroxine (SYNTHROID) 137 mcg tablet Take 137 mcg by mouth. 10/31/19 6/30/20 Yes Alma, MD Frandy   EPINEPHrine (EPIPEN JR) 0.15 mg/0.3 mL injection 1 Dose. Other, MD Frandy   albuterol (PROVENTIL HFA, VENTOLIN HFA, PROAIR HFA) 90 mcg/actuation inhaler Take 2 Puffs by inhalation. Other, MD Frandy   testosterone cypionate (DEPOTESTOTERONE CYPIONATE) 200 mg/mL injection 200 mg by IntraMUSCular route. Other, MD Frandy   clonazePAM (KLONOPIN) 0.5 mg tablet Take 0.5 mg by mouth. Other, MD Frandy   cyclobenzaprine (FLEXERIL) 10 mg tablet Take 10 mg by mouth. Other, MD Frandy   CLONAZEPAM PO Take  by mouth. Provider, Historical   CYCLOBENZAPRINE HCL (CYCLOBENZAPRINE PO) Take  by mouth. Provider, Historical   GABAPENTIN PO Take  by mouth. Provider, Historical   OTHER Take  by mouth. Indications: HYDROCODONE    Provider, Historical   OXYCODONE HCL (OXYCODONE PO) Take  by mouth. Provider, Historical   LEVOTHYROXINE SODIUM (SYNTHROID PO) Take  by mouth. Provider, Historical       Hospital Medications:  Current Facility-Administered Medications   Medication Dose Route Frequency    sodium chloride 0.9 % bolus infusion 1,000 mL  1,000 mL IntraVENous ONCE    0.9% sodium chloride infusion 250 mL  250 mL IntraVENous PRN     Current Outpatient Medications   Medication Sig    HYDROcodone-acetaminophen (NORCO)  mg tablet Take 1 Tab by mouth.  pantoprazole (PROTONIX) 40 mg tablet Take 40 mg by mouth.  levothyroxine (SYNTHROID) 137 mcg tablet Take 137 mcg by mouth.  EPINEPHrine (EPIPEN JR) 0.15 mg/0.3 mL injection 1 Dose.  albuterol (PROVENTIL HFA, VENTOLIN HFA, PROAIR HFA) 90 mcg/actuation inhaler Take 2 Puffs by inhalation.  testosterone cypionate (DEPOTESTOTERONE CYPIONATE) 200 mg/mL injection 200 mg by IntraMUSCular route.     clonazePAM (KLONOPIN) 0.5 mg tablet Take 0.5 mg by mouth.  cyclobenzaprine (FLEXERIL) 10 mg tablet Take 10 mg by mouth.  CLONAZEPAM PO Take  by mouth.  CYCLOBENZAPRINE HCL (CYCLOBENZAPRINE PO) Take  by mouth.  GABAPENTIN PO Take  by mouth.  OTHER Take  by mouth. Indications: HYDROCODONE    OXYCODONE HCL (OXYCODONE PO) Take  by mouth.  LEVOTHYROXINE SODIUM (SYNTHROID PO) Take  by mouth. Social History:  Social History     Tobacco Use    Smoking status: Current Every Day Smoker     Packs/day: 1.00     Years: 1.00     Pack years: 1.00    Tobacco comment: TOBACCO ABUSE   Substance Use Topics    Alcohol use: No       Pt denies any history of drug use, blood transfusions, or tattoos. Family History:  Family History   Problem Relation Age of Onset    Hypertension Other     Other Other         BRAIN ANEURYSM    Stroke Other        Review of Systems:  A detailed 10 system ROS is obtained, with pertinent positives as listed above. All others are negative. Diet:      Objective:     Physical Exam:  Vitals:  Visit Vitals  /88 (BP 1 Location: Right arm, BP Patient Position: At rest)   Pulse 78   Temp 98.1 °F (36.7 °C)   Resp 18   Ht 5' 10\" (1.778 m)   Wt 93.4 kg (206 lb)   SpO2 99%   BMI 29.56 kg/m²     Gen:  Pt is alert, cooperative, no acute distress  Skin:  Extremities and face reveal no rashes. HEENT: Sclerae anicteric. Extra-occular muscles are intact. No oral ulcers. No abnormal pigmentation of the lips. The neck is supple. Cardiovascular: Regular rate and rhythm. No murmurs, gallops, or rubs. Respiratory:  Comfortable breathing with no accessory muscle use. Clear breath sounds anteriorly with no wheezes, rales, or rhonchi. GI:  Abdomen nondistended, soft, and nontender. Normal active bowel sounds. No enlargement of the liver or spleen. No masses palpable. Rectal:  Deferred  Musculoskeletal:  No pitting edema of the lower legs. Neurological:  Gross memory appears intact.   Patient is alert and oriented. Psychiatric:  Mood appears appropriate with judgement intact. Lymphatic:  No cervical or supraclavicular adenopathy. Laboratory:    Recent Labs     01/27/20  1802   WBC 8.0   HGB 7.8*   HCT 25.7*      MCV 87.7      K 3.9   *   CO2 27   BUN 14   CREA 1.06   CA 8.5   GLU 86      SGOT 37   ALT 37   TBILI 0.5   ALB 3.3*   TP 6.2*   LPSE 94          Assessment:     Principal Problem:    Melena (1/27/2020)    Active Problems:    Thyroid disease (3/22/2017)      Overview: HYPOTHYROIDISM      Depression (3/22/2017)      GERD (gastroesophageal reflux disease) (3/22/2017)      Anemia (1/27/2020)      GI bleed (1/27/2020)        Plan:     GI Bleeding-  I personally reviewed his endoscopy images which are more suggestive of G.A.V. E than non-steroidal gastropathy. I agree with transfusion. It is doubtful that a bleeding scan will localize. We will plan on EGD in am.  In the interim IV PPI.

## 2020-01-28 NOTE — H&P
HOSPITALIST INITIAL HISTORY AND PHYSICAL    NAME:  Angely Lopez   Age:  52 y.o.  :   1970   MRN:   [id number]  PCP: Patricia Lawrence MD  Consulting MD:  Treatment Team: Attending Provider: Altaf Barker MD; Primary Nurse: Ana M Barahona    CHIEF COMPLAINT: weakness    HISTORY OF PRESENT ILLNESS:   Angely Lopez is a 52 y.o. male with a past medical history of GI bleed for the past several weeks, s/p EGDx2 and colonoscopy x1 with only removal of polyps and treatment with PO protonix who presents to the ER with report of feeling weak and continual black stools. He reports that he required 2 units of blood 3 days ago. Reports that he feels weak and nearly passed out today. Admits to some mild epigastric discomfort, denies vomiting    REVIEW OF SYSTEMS: Comprehensive ROS performed. Denies any fevers, chills, nausea, vomiting, otherwise negative. Past Medical History:   Diagnosis Date    Claustrophobia 3/22/2017    Depression 3/22/2017    GERD (gastroesophageal reflux disease) 3/22/2017    High cholesterol 3/22/2017    HNP (herniated nucleus pulposus), lumbar     Lumbago     Lumbar radiculopathy     Migraines 3/22/2017    Prostate disease     Scoliosis (and kyphoscoliosis), idiopathic     Thyroid disease 3/22/2017        Past Surgical History:   Procedure Laterality Date    HX BACK SURGERY      HX HEENT      SINUS SURGERY    HX ORCHIECTOMY      POSTATE DISEASE / UNILATERAL    HX TONSILLECTOMY         Prior to Admission Medications   Prescriptions Last Dose Informant Patient Reported? Taking? CLONAZEPAM PO   Yes No   Sig: Take  by mouth. CYCLOBENZAPRINE HCL (CYCLOBENZAPRINE PO)   Yes No   Sig: Take  by mouth. EPINEPHrine (EPIPEN JR) 0.15 mg/0.3 mL injection   Yes No   Si Dose. GABAPENTIN PO   Yes No   Sig: Take  by mouth. HYDROcodone-acetaminophen (NORCO)  mg tablet   Yes Yes   Sig: Take 1 Tab by mouth.    LEVOTHYROXINE SODIUM (SYNTHROID PO)   Yes No   Sig: Take by mouth. OTHER   Yes No   Sig: Take  by mouth. Indications: HYDROCODONE   OXYCODONE HCL (OXYCODONE PO)   Yes No   Sig: Take  by mouth. albuterol (PROVENTIL HFA, VENTOLIN HFA, PROAIR HFA) 90 mcg/actuation inhaler   Yes No   Sig: Take 2 Puffs by inhalation. clonazePAM (KLONOPIN) 0.5 mg tablet   Yes No   Sig: Take 0.5 mg by mouth. cyclobenzaprine (FLEXERIL) 10 mg tablet   Yes No   Sig: Take 10 mg by mouth.   levothyroxine (SYNTHROID) 137 mcg tablet   Yes Yes   Sig: Take 137 mcg by mouth.   pantoprazole (PROTONIX) 40 mg tablet   Yes Yes   Sig: Take 40 mg by mouth. testosterone cypionate (DEPOTESTOTERONE CYPIONATE) 200 mg/mL injection   Yes No   Si mg by IntraMUSCular route. Facility-Administered Medications: None       Allergies   Allergen Reactions    Levaquin [Levofloxacin] Unknown (comments)     FLUOROQUINOLONES     Prednisone Unknown (comments)     CORTICOSTEROIDS LIQUID PREDNISONE       FAMILY HISTORY: Reviewed. Negative except   Family History   Problem Relation Age of Onset    Hypertension Other     Other Other         BRAIN ANEURYSM    Stroke Other        Social History     Tobacco Use    Smoking status: Current Every Day Smoker     Packs/day: 1.00     Years: 1.00     Pack years: 1.00    Tobacco comment: TOBACCO ABUSE   Substance Use Topics    Alcohol use: No         Objective:     Visit Vitals  /88 (BP 1 Location: Right arm, BP Patient Position: At rest)   Pulse 78   Temp 98.1 °F (36.7 °C)   Resp 18   Ht 5' 10\" (1.778 m)   Wt 93.4 kg (206 lb)   SpO2 99%   BMI 29.56 kg/m²      Temp (24hrs), Av.1 °F (36.7 °C), Min:98.1 °F (36.7 °C), Max:98.1 °F (36.7 °C)    Oxygen Therapy  O2 Sat (%): 99 % (20)  Pulse via Oximetry: 77 beats per minute (20)  O2 Device: Room air (20)  Physical Exam:  General:    The patient is a pleasant middle aged male in no acute distress. Head:   Normocephalic/atraumatic.    Eyes:  No palpebral pallor or scleral icterus. ENT:  External auricular and nasal exam within normal limits. Mucous membranes are moist.  Neck:  Supple, non-tender, no JVD. Lungs:   Clear to auscultation bilaterally without wheezes or crackles. No respiratory distress or accessory muscle use. Heart:   Regular rate and rhythm, without murmurs, rubs, or gallops. Abdomen:   Soft, mildly TTP over epigastrium, non-distended with normoactive bowel sounds. Genitourinary: No tenderness over the bladder or bilateral CVAs. Extremities: Without clubbing, cyanosis, or edema. Skin:     Normal color, texture, and turgor. No rashes, lesions, or jaundice. Pulses: Radial and dorsalis pedis pulses present 2+ bilaterally. Capillary refill <2s. Neurologic: CN II-XII grossly intact and symmetrical.     Moving all four extremities well with no focal deficits. Psychiatric: Pleasant demeanor, appropriate affect. Alert and oriented x 3    Data Review:   Recent Results (from the past 24 hour(s))   TYPE & SCREEN    Collection Time: 01/27/20  6:02 PM   Result Value Ref Range    Crossmatch Expiration 01/30/2020     ABO/Rh(D) [facility] POSITIVE     Antibody screen NEG    CBC WITH AUTOMATED DIFF    Collection Time: 01/27/20  6:02 PM   Result Value Ref Range    WBC 8.0 4.3 - 11.1 K/uL    RBC 2.93 (L) 4.23 - 5.6 M/uL    HGB 7.8 (L) 13.6 - 17.2 g/dL    HCT 25.7 (L) 41.1 - 50.3 %    MCV 87.7 79.6 - 97.8 FL    MCH 26.6 26.1 - 32.9 PG    MCHC 30.4 (L) 31.4 - 35.0 g/dL    RDW 17.2 (H) 11.9 - 14.6 %    PLATELET 116 416 - 454 K/uL    MPV 11.0 9.4 - 12.3 FL    ABSOLUTE NRBC 0.00 0.0 - 0.2 K/uL    DF AUTOMATED      NEUTROPHILS 54 43 - 78 %    LYMPHOCYTES 35 13 - 44 %    MONOCYTES 7 4.0 - 12.0 %    EOSINOPHILS 3 0.5 - 7.8 %    BASOPHILS 1 0.0 - 2.0 %    IMMATURE GRANULOCYTES 0 0.0 - 5.0 %    ABS. NEUTROPHILS 4.3 1.7 - 8.2 K/UL    ABS. LYMPHOCYTES 2.8 0.5 - 4.6 K/UL    ABS. MONOCYTES 0.6 0.1 - 1.3 K/UL    ABS. EOSINOPHILS 0.2 0.0 - 0.8 K/UL    ABS.  BASOPHILS 0.1 0.0 - 0.2 K/UL ABS. IMM. GRANS. 0.0 0.0 - 0.5 K/UL   METABOLIC PANEL, COMPREHENSIVE    Collection Time: 01/27/20  6:02 PM   Result Value Ref Range    Sodium 141 136 - 145 mmol/L    Potassium 3.9 3.5 - 5.1 mmol/L    Chloride 109 (H) 98 - 107 mmol/L    CO2 27 21 - 32 mmol/L    Anion gap 5 (L) 7 - 16 mmol/L    Glucose 86 65 - 100 mg/dL    BUN 14 6 - 23 MG/DL    Creatinine 1.06 0.8 - 1.5 MG/DL    GFR est AA >60 >60 ml/min/1.73m2    GFR est non-AA >60 >60 ml/min/1.73m2    Calcium 8.5 8.3 - 10.4 MG/DL    Bilirubin, total 0.5 0.2 - 1.1 MG/DL    ALT (SGPT) 37 12 - 65 U/L    AST (SGOT) 37 15 - 37 U/L    Alk. phosphatase 109 50 - 136 U/L    Protein, total 6.2 (L) 6.3 - 8.2 g/dL    Albumin 3.3 (L) 3.5 - 5.0 g/dL    Globulin 2.9 2.3 - 3.5 g/dL    A-G Ratio 1.1 (L) 1.2 - 3.5         Imaging /Procedures /Studies:  No results found. Assessment and Plan:     Principal Problem:    Melena (1/27/2020)    Persistent GI Bleed. GI aware. Will observe on med/surg. Transfusion of one unit ordered by ER MD. NPO. IVF, IV protonix. GI consultation for possible repeat EGD in AM.    Active Problems:    Anemia (1/27/2020)    Per abovce. GI bleed (1/27/2020)    Per above. Thyroid disease (3/22/2017)    Stable. Continue home meds. Depression (3/22/2017)    Stable. Continue home meds. GERD (gastroesophageal reflux disease) (3/22/2017)    Stable. Continue home meds. DVT Prophylaxis: SCDs      Code Status: FULL CODE      Disposition: Observe on remote tele for evaluation and treatment as per above.       Anticipated discharge: < 2 midnights     Signed By: Anibal Miranda MD     January 27, 2020

## 2020-01-28 NOTE — PROGRESS NOTES
TRANSFER - IN REPORT:    Verbal report received from Angelica Miguel, [facility] on Marcelino Issa  being received from GI soft for routine post - op      Report consisted of patients Situation, Background, Assessment and   Recommendations(SBAR). Information from the following report(s) SBAR was reviewed with the receiving nurse. Opportunity for questions and clarification was provided. Assessment completed upon patients arrival to unit and care assumed.

## 2020-01-28 NOTE — ANESTHESIA POSTPROCEDURE EVALUATION
Procedure(s):  ESOPHAGOGASTRODUODENOSCOPY (EGD)  ESOPHAGOGASTRODUODENAL (EGD) BIOPSY  ENDOSCOPIC ARGON PLASMA COAGULATION. total IV anesthesia    Anesthesia Post Evaluation      Multimodal analgesia: multimodal analgesia used between 6 hours prior to anesthesia start to PACU discharge  Patient location during evaluation: PACU  Patient participation: complete - patient participated  Level of consciousness: awake and alert  Pain management: adequate  Airway patency: patent  Anesthetic complications: no  Cardiovascular status: acceptable and hemodynamically stable  Respiratory status: acceptable  Hydration status: acceptable        Vitals Value Taken Time   /54 1/28/2020 11:17 AM   Temp 36.1 °C (97 °F) 1/28/2020 10:57 AM   Pulse 72 1/28/2020 11:20 AM   Resp 14 1/28/2020 11:17 AM   SpO2 99 % 1/28/2020 11:20 AM   Vitals shown include unvalidated device data.

## 2020-01-28 NOTE — ANESTHESIA PREPROCEDURE EVALUATION
Relevant Problems   No relevant active problems       Anesthetic History   No history of anesthetic complications            Review of Systems / Medical History  Patient summary reviewed and pertinent labs reviewed    Pulmonary          Smoker (former)         Neuro/Psych              Cardiovascular                       GI/Hepatic/Renal                Endo/Other      Hypothyroidism  Anemia     Other Findings              Physical Exam    Airway  Mallampati: III  TM Distance: 4 - 6 cm  Neck ROM: normal range of motion   Mouth opening: Diminished (comment)     Cardiovascular    Rhythm: regular  Rate: normal         Dental         Pulmonary  Breath sounds clear to auscultation               Abdominal         Other Findings            Anesthetic Plan    ASA: 2  Anesthesia type: total IV anesthesia          Induction: Intravenous  Anesthetic plan and risks discussed with: Patient and Spouse

## 2020-01-28 NOTE — PROGRESS NOTES
TRANSFER - OUT REPORT:    Verbal report given to [facility], RN on [employer]  being transferred to  lab) for ordered procedure       Report consisted of patients Situation, Background, Assessment and   Recommendations(SBAR). Information from the following report(s) SBAR was reviewed with the receiving nurse. Lines:   Peripheral IV 01/27/20 Left Antecubital (Active)   Site Assessment Clean, dry, & intact 1/28/2020  2:50 AM   Phlebitis Assessment 0 1/28/2020  2:50 AM   Infiltration Assessment 0 1/28/2020  2:50 AM   Dressing Status Clean, dry, & intact; Occlusive 1/28/2020  2:50 AM   Dressing Type Tape;Transparent 1/28/2020  2:50 AM   Hub Color/Line Status Infusing 1/28/2020  2:50 AM        Opportunity for questions and clarification was provided.       Patient transported with:   [employer]

## 2020-01-28 NOTE — PROCEDURES
EGD Procedure Note    PreOp Diagnosis:   Melena   Acute blood loss anemia   Epigastric pain     PostOp Diagnosis:  GAVE  Duodenitis     Medications:  Monitored Anesthesia    Procedure:  EGD   Instrument:   After informed consent was obtained, the patient was sedated and the endoscope was inserted  in the mouth and advanced into the duodenum without difficulty. The scope was slowly withdrawn while the mucosa was carefully inspected, including a retroflexed view of the cardia and fundus. Findings:   Esophagus: The proximal and mid esophagus appeared normal.  In the distal esophagus, The Z line was normal   Stomach: Severe erythema, and mucosal edema, vascular ectasias in the gastric antrum. 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 A few tiny gastric vascular ectasias in the proximal stomach. Biopsies obtained. ABC was performed in the antrum at 20 kong, 0.8 L and circumferential probe. No evidence of complications. Duodenum:   Nodular, patchy erythema and friability, mild scalloping of the folds. Changes suspicious for celiac disease. Biopsies obtained. Recommendations:   Follow up pathology results  Follow up  CBC   Advance diet    PPI and Carafate for healing of APC/cautery   Consider RFA For additional treatment   Check abdominal ultrasound to evaluate for cirrhosis, splenic vein thrombosis, portal hypertension etc.     Jonathan Crooks MD

## 2020-01-28 NOTE — PROGRESS NOTES
TRANSFER - IN REPORT:    Verbal report received from [facility] on Megan Marques  being received from [phone] for ordered procedure      Report consisted of patients Situation, Background, Assessment and   Recommendations(SBAR). Information from the following report(s) SBAR, Kardex, Intake/Output, MAR, Recent Results, Pre Procedure Checklist and Procedure Verification was reviewed with the receiving nurse. Opportunity for questions and clarification was provided.

## 2020-01-28 NOTE — PROGRESS NOTES
Problem: Falls - Risk of  Goal: *Absence of Falls  Description  Document Smith Carlson Fall Risk and appropriate interventions in the flowsheet.   Outcome: Progressing Towards Goal  Note: Fall Risk Interventions:            Medication Interventions: Teach patient to arise slowly                   Problem: Patient Education: Go to Patient Education Activity  Goal: Patient/Family Education  Outcome: Progressing Towards Goal

## 2020-01-28 NOTE — ED PROVIDER NOTES
Presents the ER complaining of recurrent episodes of GI bleeding. Reports he has had recurrence of symptoms over the past couple weeks. Has been seen at outside ER, reports he has had endoscopies and colonoscopy without any acute source of bleeding. States most recently was transfused 2 units of blood 3 days ago. Continues to have dark black stools. Reports near syncope and shortness of breath. Have a large dark stool earlier today. Currently reports recurrence of epigastric abdominal pain    The history is provided by the patient. Abdominal Pain    This is a recurrent problem. The current episode started more than 1 week ago. The problem has not changed since onset. The pain is located in the epigastric region. The quality of the pain is aching and cramping. The pain is at a severity of 6/10. The pain is moderate. Associated symptoms include melena and nausea. Pertinent negatives include no fever, no belching, no flatus, no hematochezia, no frequency, no arthralgias and no back pain. Nothing worsens the pain. The pain is relieved by nothing.         Past Medical History:   Diagnosis Date    Claustrophobia 3/22/2017    Depression 3/22/2017    GERD (gastroesophageal reflux disease) 3/22/2017    High cholesterol 3/22/2017    HNP (herniated nucleus pulposus), lumbar     Lumbago     Lumbar radiculopathy     Migraines 3/22/2017    Prostate disease     Scoliosis (and kyphoscoliosis), idiopathic     Thyroid disease 3/22/2017       Past Surgical History:   Procedure Laterality Date    HX BACK SURGERY      HX HEENT      SINUS SURGERY    HX ORCHIECTOMY      POSTATE DISEASE / UNILATERAL    HX TONSILLECTOMY           Family History:   Problem Relation Age of Onset    Hypertension Other     Other Other         BRAIN ANEURYSM    Stroke Other        Social History     Socioeconomic History    Marital status:      Spouse name: Not on file    Number of children: Not on file    Years of education: Not on file    Highest education level: Not on file   Occupational History    Not on file   Social Needs    Financial resource strain: Not on file    Food insecurity:     Worry: Not on file     Inability: Not on file    Transportation needs:     Medical: Not on file     Non-medical: Not on file   Tobacco Use    Smoking status: Current Every Day Smoker     Packs/day: 1.00     Years: 1.00     Pack years: 1.00    Tobacco comment: TOBACCO ABUSE   Substance and Sexual Activity    Alcohol use: No    Drug use: Not on file    Sexual activity: Not on file   Lifestyle    Physical activity:     Days per week: Not on file     Minutes per session: Not on file    Stress: Not on file   Relationships    Social connections:     Talks on phone: Not on file     Gets together: Not on file     Attends [employer] service: Not on file     Active member of club or organization: Not on file     Attends meetings of clubs or organizations: Not on file     Relationship status: Not on file    Intimate partner violence:     Fear of current or ex partner: Not on file     Emotionally abused: Not on file     Physically abused: Not on file     Forced sexual activity: Not on file   Other Topics Concern    Not on file   Social History Narrative    Not on file         ALLERGIES: Levaquin [levofloxacin] and Prednisone    Review of Systems   Constitutional: Negative for fatigue, fever and unexpected weight change. HENT: Negative for congestion and dental problem. Eyes: Negative for photophobia and visual disturbance. Respiratory: Negative for chest tightness. Cardiovascular: Negative for leg swelling. Gastrointestinal: Positive for abdominal pain, melena and nausea. Negative for flatus and hematochezia. Endocrine: Negative for polydipsia and polyphagia. Genitourinary: Negative for flank pain and frequency. Musculoskeletal: Negative for arthralgias and back pain. Skin: Negative for color change and pallor. Neurological: Negative for light-headedness and numbness. Hematological: Negative for adenopathy. Does not bruise/bleed easily. Psychiatric/Behavioral: Negative for behavioral problems and confusion. All other systems reviewed and are negative. Vitals:    01/27/20 1758   BP: 135/88   Pulse: 78   Resp: 18   Temp: 98.1 °F (36.7 °C)   SpO2: 99%   Weight: 93.4 kg (206 lb)   Height: 5' 10\" (1.778 m)            Physical Exam  Vitals signs and nursing note reviewed. Constitutional:       Appearance: Normal appearance. HENT:      Head: Normocephalic and atraumatic. Neck:      Musculoskeletal: Normal range of motion and neck supple. Cardiovascular:      Rate and Rhythm: Normal rate and regular rhythm. Pulses: Normal pulses. Heart sounds: Normal heart sounds. Pulmonary:      Effort: Pulmonary effort is normal. No respiratory distress. Breath sounds: Normal breath sounds. No stridor. Abdominal:      General: Abdomen is flat. Bowel sounds are normal.      Palpations: Abdomen is soft. Tenderness: There is abdominal tenderness in the epigastric area. Genitourinary:     Rectum: Guaiac result positive. Musculoskeletal: Normal range of motion. General: No swelling or tenderness. Skin:     General: Skin is warm. Capillary Refill: Capillary refill takes less than 2 seconds. Coloration: Skin is not jaundiced. Neurological:      General: No focal deficit present. Mental Status: He is alert. Mental status is at baseline. MDM  Number of Diagnoses or Management Options  Diagnosis management comments: Patient does have melena on exam, plan to obtain basic labs here, type and screen as well    9:01 PM  Hemoglobin here today is 7.8, previous hemoglobin is 9.3      9:35 PM  Case discussed with GI as well as hospitalist for admission.        Amount and/or Complexity of Data Reviewed  Clinical lab tests: ordered and reviewed  Tests in the medicine section of CPT®: ordered and reviewed  Review and summarize past medical records: yes (CBC w/Differential (01/22/2020 5:56 AM EST)  CBC w/Differential (01/22/2020 5:56 AM EST)  Component Value Ref Range Performed At Pathologist Signature  WBC 6.7 4.0 - 11.5 TH/mm3 [address] [place] [address] LABORATORY    RBC 3.51 (L) 4.50 - 5.90 Mil/mm3 [address] [place] [address] LABORATORY    Hemoglobin 9.3 (L) 13.5 - 17.5 g/dL [address] [place] [address] LABORATORY    Hematocrit 30.0 (L) 41.0 - 53.0 % [facility] LABORATORY    MCV 85.5 80.0 - 100.0 fL [address] LABORATORY    MCH 26.5 26.0 - 34.0 pg [address] [place] [address] LABORATORY    MCHC 31.0 31.0 - 37.0 g/dL [address] [place] [address] LABORATORY    RDW 18.6 (H) 12.0 - 16.0 % [facility] LABORATORY    Platelets 700 245 - 054 Th/mm3 [address] [place] [address] LABORATORY    Neutrophils, % 52.5 % [address] [place] [address] LABORATORY    Lymphocytes, % 36.1 % [address] [place] [address] LABORATORY    Monocytes % 6.4 % [address] [place] [address] LABORATORY    Eosinophils % 3.7 % [address] [place] [address] LABORATORY    Basophils % 1.0 % [address] [place] [address] LABORATORY    Neutrophils, Abs 3.5 1.4 - 6.6 TH/mm3 [address] [place] [address] LABORATORY    Lymphocytes, Abs 2.4 1.0 - 3.5 TH/mm3 [address] [place] [address] LABORATORY    Monocytes Abs 0.4 <1.0 TH/mm3 [address] [place] [address] LABORATORY    Eosinophils, Abs 0.3 <0.7 TH/mm3 [address] [place] [address] LABORATORY    Basophils, Abs 0.1 (H) <0.1 TH/mm3 [facility]      )    Risk of Complications, Morbidity, and/or Mortality  Presenting problems: moderate  Diagnostic procedures: moderate  Management options: moderate    Patient Progress  Patient progress: stable Procedures    Results Include:    Recent Results (from the past 24 hour(s))   TYPE & SCREEN    Collection Time: 01/27/20  6:02 PM   Result Value Ref Range    Crossmatch Expiration 01/30/2020     ABO/Rh(D) Carlie Jasmine POSITIVE     Antibody screen NEG    CBC WITH AUTOMATED DIFF    Collection Time: 01/27/20  6:02 PM   Result Value Ref Range    WBC 8.0 4.3 - 11.1 K/uL    RBC 2.93 (L) 4.23 - 5.6 M/uL    HGB 7.8 (L) 13.6 - 17.2 g/dL    HCT 25.7 (L) 41.1 - 50.3 %    MCV 87.7 79.6 - 97.8 FL    MCH 26.6 26.1 - 32.9 PG    MCHC 30.4 (L) 31.4 - 35.0 g/dL    RDW 17.2 (H) 11.9 - 14.6 %    PLATELET 642 721 - 521 K/uL    MPV 11.0 9.4 - 12.3 FL    ABSOLUTE NRBC 0.00 0.0 - 0.2 K/uL    DF AUTOMATED      NEUTROPHILS 54 43 - 78 %    LYMPHOCYTES 35 13 - 44 %    MONOCYTES 7 4.0 - 12.0 %    EOSINOPHILS 3 0.5 - 7.8 %    BASOPHILS 1 0.0 - 2.0 %    IMMATURE GRANULOCYTES 0 0.0 - 5.0 %    ABS. NEUTROPHILS 4.3 1.7 - 8.2 K/UL    ABS. LYMPHOCYTES 2.8 0.5 - 4.6 K/UL    ABS. MONOCYTES 0.6 0.1 - 1.3 K/UL    ABS. EOSINOPHILS 0.2 0.0 - 0.8 K/UL    ABS. BASOPHILS 0.1 0.0 - 0.2 K/UL    ABS. IMM. GRANS. 0.0 0.0 - 0.5 K/UL   METABOLIC PANEL, COMPREHENSIVE    Collection Time: 01/27/20  6:02 PM   Result Value Ref Range    Sodium 141 136 - 145 mmol/L    Potassium 3.9 3.5 - 5.1 mmol/L    Chloride 109 (H) 98 - 107 mmol/L    CO2 27 21 - 32 mmol/L    Anion gap 5 (L) 7 - 16 mmol/L    Glucose 86 65 - 100 mg/dL    BUN 14 6 - 23 MG/DL    Creatinine 1.06 0.8 - 1.5 MG/DL    GFR est AA >60 >60 ml/min/1.73m2    GFR est non-AA >60 >60 ml/min/1.73m2    Calcium 8.5 8.3 - 10.4 MG/DL    Bilirubin, total 0.5 0.2 - 1.1 MG/DL    ALT (SGPT) 37 12 - 65 U/L    AST (SGOT) 37 15 - 37 U/L    Alk. phosphatase 109 50 - 136 U/L    Protein, total 6.2 (L) 6.3 - 8.2 g/dL    Albumin 3.3 (L) 3.5 - 5.0 g/dL    Globulin 2.9 2.3 - 3.5 g/dL    A-G Ratio 1.1 (L) 1.2 - 3.5       Voice dictation software was used during the making of this note.   This software is not perfect and grammatical and other typographical errors may be present. This note has been proofread, but may still contain errors.   José Joseph MD; 1/27/2020 @8:22 PM   ===================================================================

## 2020-01-28 NOTE — PROGRESS NOTES
CM met with wife and pt in rm 522. Request for letter stating that pt is currently in the hospital. Wife asked that the letter be faxed [phone] to pts employer  attention [place]. Please consult  if any new issues arise  Case management will continue to follow    Care Management Interventions  PCP Verified by CM:  Yes  Mode of Transport at Discharge: Self  Transition of Care Consult (CM Consult): Discharge Planning  Discharge Durable Medical Equipment: No  Physical Therapy Consult: No  Occupational Therapy Consult: No  Speech Therapy Consult: No  Current Support Network: Own Home, Lives with Spouse, Family Lives Nearby  The Plan for Transition of Care is Related to the Following Treatment Goals : no needs anticipated  Discharge Location  Discharge Placement: Home

## 2020-01-28 NOTE — ED NOTES
TRANSFER - OUT REPORT:    Verbal report given to LINDSEY Contreras(name) on Constanza Ham  being transferred to 522(unit) for routine progression of care       Report consisted of patients Situation, Background, Assessment and   Recommendations(SBAR). Information from the following report(s) SBAR and ED Summary was reviewed with the receiving nurse. Lines:   Peripheral IV 01/27/20 Left Antecubital (Active)   Site Assessment Clean, dry, & intact 1/27/2020  6:04 PM   Phlebitis Assessment 0 1/27/2020  6:04 PM   Infiltration Assessment 0 1/27/2020  6:04 PM   Dressing Status Clean, dry, & intact 1/27/2020  6:04 PM   Hub Color/Line Status Pink 1/27/2020  6:04 PM        Opportunity for questions and clarification was provided.       Patient transported with:   [employer]

## 2020-01-29 ENCOUNTER — APPOINTMENT (OUTPATIENT)
Dept: MRI IMAGING | Age: 50
DRG: 378 | End: 2020-01-29
Payer: COMMERCIAL

## 2020-01-29 ENCOUNTER — APPOINTMENT (OUTPATIENT)
Dept: ULTRASOUND IMAGING | Age: 50
DRG: 378 | End: 2020-01-29
Attending: INTERNAL MEDICINE
Payer: COMMERCIAL

## 2020-01-29 LAB
ANION GAP SERPL CALC-SCNC: 6 MMOL/L (ref 7–16)
BUN SERPL-MCNC: 14 MG/DL (ref 6–23)
CALCIUM SERPL-MCNC: 8 MG/DL (ref 8.3–10.4)
CHLORIDE SERPL-SCNC: 112 MMOL/L (ref 98–107)
CO2 SERPL-SCNC: 25 MMOL/L (ref 21–32)
CREAT SERPL-MCNC: 0.95 MG/DL (ref 0.8–1.5)
ERYTHROCYTE [DISTWIDTH] IN BLOOD BY AUTOMATED COUNT: 16.9 % (ref 11.9–14.6)
GLUCOSE SERPL-MCNC: 100 MG/DL (ref 65–100)
HCT VFR BLD AUTO: 24.3 % (ref 41.1–50.3)
HCT VFR BLD AUTO: 24.4 % (ref 41.1–50.3)
HGB BLD-MCNC: 7.3 G/DL (ref 13.6–17.2)
HGB BLD-MCNC: 7.5 G/DL (ref 13.6–17.2)
MCH RBC QN AUTO: 26.8 PG (ref 26.1–32.9)
MCHC RBC AUTO-ENTMCNC: 30.7 G/DL (ref 31.4–35)
MCV RBC AUTO: 87.1 FL (ref 79.6–97.8)
NRBC # BLD: 0 K/UL (ref 0–0.2)
PLATELET # BLD AUTO: 226 K/UL (ref 150–450)
PMV BLD AUTO: 10.2 FL (ref 9.4–12.3)
POTASSIUM SERPL-SCNC: 4.1 MMOL/L (ref 3.5–5.1)
RBC # BLD AUTO: 2.8 M/UL (ref 4.23–5.6)
SODIUM SERPL-SCNC: 143 MMOL/L (ref 136–145)
WBC # BLD AUTO: 8.6 K/UL (ref 4.3–11.1)

## 2020-01-29 PROCEDURE — 74181 MRI ABDOMEN W/O CONTRAST: CPT

## 2020-01-29 PROCEDURE — 36415 COLL VENOUS BLD VENIPUNCTURE: CPT

## 2020-01-29 PROCEDURE — 36430 TRANSFUSION BLD/BLD COMPNT: CPT

## 2020-01-29 PROCEDURE — P9016 RBC LEUKOCYTES REDUCED: HCPCS

## 2020-01-29 PROCEDURE — 76700 US EXAM ABDOM COMPLETE: CPT

## 2020-01-29 PROCEDURE — 85018 HEMOGLOBIN: CPT

## 2020-01-29 PROCEDURE — 99218 HC RM OBSERVATION: CPT

## 2020-01-29 PROCEDURE — 74011250637 HC RX REV CODE- 250/637: Performed by: FAMILY MEDICINE

## 2020-01-29 PROCEDURE — 80048 BASIC METABOLIC PNL TOTAL CA: CPT

## 2020-01-29 PROCEDURE — 65270000029 HC RM PRIVATE

## 2020-01-29 PROCEDURE — 74011250637 HC RX REV CODE- 250/637: Performed by: INTERNAL MEDICINE

## 2020-01-29 PROCEDURE — 85027 COMPLETE CBC AUTOMATED: CPT

## 2020-01-29 RX ORDER — SODIUM CHLORIDE 9 MG/ML
250 INJECTION, SOLUTION INTRAVENOUS AS NEEDED
Status: DISCONTINUED | OUTPATIENT
Start: 2020-01-29 | End: 2020-01-30 | Stop reason: HOSPADM

## 2020-01-29 RX ORDER — LANOLIN ALCOHOL/MO/W.PET/CERES
1 CREAM (GRAM) TOPICAL 2 TIMES DAILY WITH MEALS
Status: DISCONTINUED | OUTPATIENT
Start: 2020-01-29 | End: 2020-01-30 | Stop reason: HOSPADM

## 2020-01-29 RX ADMIN — LEVOTHYROXINE SODIUM 137 MCG: 0.11 TABLET ORAL at 07:19

## 2020-01-29 RX ADMIN — PANTOPRAZOLE SODIUM 40 MG: 40 TABLET, DELAYED RELEASE ORAL at 17:07

## 2020-01-29 RX ADMIN — PANTOPRAZOLE SODIUM 40 MG: 40 TABLET, DELAYED RELEASE ORAL at 07:19

## 2020-01-29 RX ADMIN — SUCRALFATE 1 G: 1 TABLET ORAL at 21:29

## 2020-01-29 RX ADMIN — HYDROCODONE BITARTRATE AND ACETAMINOPHEN 1 TABLET: 10; 325 TABLET ORAL at 10:44

## 2020-01-29 RX ADMIN — FERROUS SULFATE TAB 325 MG (65 MG ELEMENTAL FE) 325 MG: 325 (65 FE) TAB at 17:07

## 2020-01-29 RX ADMIN — SUCRALFATE 1 G: 1 TABLET ORAL at 07:19

## 2020-01-29 NOTE — PROGRESS NOTES
Progress Note    Patient: Marcelino Issa MRN: [id number]  SSN: [id number]    YOB: 1970  Age: 52 y.o. Sex: male      Admit Date: 1/27/2020    LOS: 0 days     Subjective:     PMH of GI bleeding. S/P EGD twice and colonoscopy. He was told that he had 9 polyps in the colon. They were all removed. For EGD he was told there was no evidence of sources of bleeding. Admitted this time because of passing dark-colored stool. He just went to EGD this morning. Found to have [address] patient denies any other bleeding per rectum since admission. No hematemesis. No fever. No shaking. No chills. No shortness of breath. US shows CBD dilation. Patient is to have MRCP today. Objective:     Vitals:    01/29/20 0021 01/29/20 0314 01/29/20 0740 01/29/20 1100   BP: 138/72 117/63 128/69 133/77   Pulse: 82 78 71 85   Resp: 18 18 18 18   Temp: 98.2 °F (36.8 °C) 98 °F (36.7 °C) 98.3 °F (36.8 °C) 97.4 °F (36.3 °C)   SpO2: 96% 97% 94% 91%   Weight:       Height:            Intake and Output:  Current Shift: No intake/output data recorded. Last three shifts: 01/27 1901 - 01/29 0700  In: 2084 [P.O.:480; I.V.:1289]  Out: 0     Physical Exam:     General:                    The patient is a pleasant middle aged male in no acute distress. Head:                                   Normocephalic/atraumatic. Eyes:                                   palpebral pallor, no scleral icterus. ENT:                                    External auricular and nasal exam within normal limits. Mucous membranes are moist.  Neck:                                   Supple, non-tender, no JVD. Lungs:                       Clear to auscultation bilaterally without wheezes or crackles. No respiratory distress or accessory muscle use.   Heart:                                  Regular rate and rhythm, without murmurs, rubs, or gallops. Abdomen:                  Soft, non-tender, distended with normoactive bowel sounds. Genitourinary:           No tenderness over the bladder or bilateral CVAs. Extremities:               Without clubbing, cyanosis, or edema. Skin:                                    Normal color, texture, and turgor. No rashes, lesions, or jaundice. Pulses:                      Radial and dorsalis pedis pulses present 2+ bilaterally. Capillary refill <2s. Neurologic:                CN II-XII grossly intact and symmetrical.                                               Moving all four extremities well with no focal deficits. Psychiatric:                Pleasant demeanor, appropriate affect. Alert and oriented x 3      Lab/Data Review:    Recent Results (from the past 24 hour(s))   CBC WITH AUTOMATED DIFF    Collection Time: 01/28/20  1:22 PM   Result Value Ref Range    WBC 4.6 4.3 - 11.1 K/uL    RBC 3.08 (L) 4.23 - 5.6 M/uL    HGB 8.2 (L) 13.6 - 17.2 g/dL    HCT 27.0 (L) 41.1 - 50.3 %    MCV 87.7 79.6 - 97.8 FL    MCH 26.6 26.1 - 32.9 PG    MCHC 30.4 (L) 31.4 - 35.0 g/dL    RDW 16.8 (H) 11.9 - 14.6 %    PLATELET 265 863 - 999 K/uL    MPV 10.4 9.4 - 12.3 FL    ABSOLUTE NRBC 0.00 0.0 - 0.2 K/uL    DF AUTOMATED      NEUTROPHILS 50 43 - 78 %    LYMPHOCYTES 40 13 - 44 %    MONOCYTES 6 4.0 - 12.0 %    EOSINOPHILS 3 0.5 - 7.8 %    BASOPHILS 1 0.0 - 2.0 %    IMMATURE GRANULOCYTES 0 0.0 - 5.0 %    ABS. NEUTROPHILS 2.3 1.7 - 8.2 K/UL    ABS. LYMPHOCYTES 1.8 0.5 - 4.6 K/UL    ABS. MONOCYTES 0.3 0.1 - 1.3 K/UL    ABS. EOSINOPHILS 0.2 0.0 - 0.8 K/UL    ABS. BASOPHILS 0.0 0.0 - 0.2 K/UL    ABS. IMM.  GRANS. 0.0 0.0 - 0.5 K/UL   METABOLIC PANEL, BASIC    Collection Time: 01/29/20  3:50 AM   Result Value Ref Range    Sodium 143 136 - 145 mmol/L    Potassium 4.1 3.5 - 5.1 mmol/L    Chloride 112 (H) 98 - 107 mmol/L    CO2 25 21 - 32 mmol/L    Anion gap 6 (L) 7 - 16 mmol/L    Glucose 100 65 - 100 mg/dL    BUN 14 6 - 23 MG/DL    Creatinine 0.95 0.8 - 1.5 MG/DL    GFR est AA >60 >60 ml/min/1.73m2    GFR est non-AA >60 >60 ml/min/1.73m2    Calcium 8.0 (L) 8.3 - 10.4 MG/DL   CBC W/O DIFF    Collection Time: 01/29/20  3:50 AM   Result Value Ref Range    WBC 8.6 4.3 - 11.1 K/uL    RBC 2.80 (L) 4.23 - 5.6 M/uL    HGB 7.5 (L) 13.6 - 17.2 g/dL    HCT 24.4 (L) 41.1 - 50.3 %    MCV 87.1 79.6 - 97.8 FL    MCH 26.8 26.1 - 32.9 PG    MCHC 30.7 (L) 31.4 - 35.0 g/dL    RDW 16.9 (H) 11.9 - 14.6 %    PLATELET 465 583 - 929 K/uL    MPV 10.2 9.4 - 12.3 FL    ABSOLUTE NRBC 0.00 0.0 - 0.2 K/uL   HGB & HCT    Collection Time: 01/29/20  9:06 AM   Result Value Ref Range    HGB 7.3 (L) 13.6 - 17.2 g/dL    HCT 24.3 (L) 41.1 - 50.3 %     EGD findings   1-  Stomach: Severe erythema, and mucosal edema, vascular ectasias in the gastric antrum. 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 A few tiny gastric vascular ectasias in the proximal stomach. Biopsies obtained. ABC was performed in the antrum at 20 kong, 0.8 L and circumferential probe. No evidence of complications. Duodenum:   Nodular, patchy erythema and friability, mild scalloping of the folds. Changes suspicious for celiac disease. Biopsies obtained. US abdomen   1-  IMPRESSION: Mild splenomegaly, 16.1 cm. Hepatopedal flow in the patent splenic  vein and portal vein. Liver size is prominent. Common bile duct is dilated, 12  mm with internal echoes, possibly sludge or inflammatory debris. No current facility-administered medications on file prior to encounter. Current Outpatient Medications on File Prior to Encounter   Medication Sig Dispense Refill    HYDROcodone-acetaminophen (NORCO)  mg tablet Take 1 Tab by mouth.  pantoprazole (PROTONIX) 40 mg tablet Take 40 mg by mouth.  clonazePAM (KLONOPIN) 0.5 mg tablet Take 0.5 mg by mouth.  levothyroxine (SYNTHROID) 137 mcg tablet Take 137 mcg by mouth.       EPINEPHrine (EPIPEN JR) 0.15 mg/0.3 mL injection 1 Dose.  albuterol (PROVENTIL HFA, VENTOLIN HFA, PROAIR HFA) 90 mcg/actuation inhaler Take 2 Puffs by inhalation.  testosterone cypionate (DEPOTESTOTERONE CYPIONATE) 200 mg/mL injection 200 mg by IntraMUSCular route.  cyclobenzaprine (FLEXERIL) 10 mg tablet Take 10 mg by mouth.  CLONAZEPAM PO Take  by mouth.  CYCLOBENZAPRINE HCL (CYCLOBENZAPRINE PO) Take  by mouth.  GABAPENTIN PO Take  by mouth.  OXYCODONE HCL (OXYCODONE PO) Take  by mouth.  LEVOTHYROXINE SODIUM (SYNTHROID PO) Take  by mouth.          Current Facility-Administered Medications:     ferrous sulfate tablet 325 mg, 1 Tab, Oral, BID WITH MEALS, Luz Sanders MD    0.9% sodium chloride infusion 250 mL, 250 mL, IntraVENous, PRN, JAELYN Stephens    pantoprazole (PROTONIX) tablet 40 mg, 40 mg, Oral, ACB&D, Parvez Colorado MD, 40 mg at 01/29/20 0719    sucralfate (CARAFATE) tablet 1 g, 1 g, Oral, AC&HS, Parvez Colorado MD, 1 g at 01/29/20 0719    0.9% sodium chloride infusion 250 mL, 250 mL, IntraVENous, PRN, Cornelia Crowley MD    clonazePAM (KlonoPIN) tablet 0.5 mg, 0.5 mg, Oral, DAILY PRN, Kim Jordan MD    HYDROcodone-acetaminophen (NORCO)  mg tablet 1 Tab, 1 Tab, Oral, Q4H PRN, Kim Jordan MD, 1 Tab at 01/29/20 1044    levothyroxine (SYNTHROID) tablet 137 mcg, 137 mcg, Oral, 6am, Jocy HUTTON MD, 137 mcg at 01/29/20 0719    0.9% sodium chloride infusion, 75 mL/hr, IntraVENous, CONTINUOUS, Ayala Guevara MD, Last Rate: 75 mL/hr at 01/28/20 1433, 75 mL/hr at 01/28/20 1433    sodium chloride (NS) flush 5-40 mL, 5-40 mL, IntraVENous, PRN, Kim Jordan MD    ondansetron [facility]) injection 4 mg, 4 mg, IntraVENous, Q4H PRN, Jocy HUTTON MD, 4 mg at 01/28/20 2114      Assessment:     Principal Problem:    GI bleed (1/27/2020)    Active Problems:    Thyroid disease (3/22/2017)      Overview: HYPOTHYROIDISM      Depression (3/22/2017)      GERD (gastroesophageal reflux disease) (3/22/2017)      Anemia (1/27/2020)        Plan:     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 Continue PPI and Carafate  Monitor symptoms  Monitor his hemoglobin    US as above. Patient will have MRCP today as per GI. Patient advised not to smoke, not to abuse alcohol. Hypothyroidism   Continue home medications. I have discussed the plan of care with patient.      DVT prophylaxis : SCD     Signed By: Blair Somers MD     January 29, 2020

## 2020-01-29 NOTE — PROGRESS NOTES
GI/Dr. Konstantin Burrell    Have reviewed results of f/u labs and complete abdominal US. Hgb notably dropped further to 7.3. Per his RN, still no BMs today. We are ordering 1u pRBCs for transfusion for today. Also, US reported CBD dilated at 12mm and other findings below. We are keeping patient NPO for MRCP to be done hopefully later today. Complete Abdominal US 1/29/20  FINDINGS: Pancreas is grossly unremarkable although the tail is partially obscured by overlying bowel gas. . Aorta is normal caliber, 1.9 cm. The IVC is patent. The normal sized spleen has a homogenous echotexture and measures 16.1 cm. Splenic vein is patent. There is hepatopedal flow in the splenic vein. Left kidney is unremarkable without hydronephrosis and measures 11.9 cm. Right kidney is unremarkable without hydronephrosis and measures 11.2 cm. Renal echogenicity is normal, the right kidney is hypoechoic to the liver. The intrahepatic biliary tree is not dilated. There is hepatopetal flow in the portal vein. No gross focal parenchymal abnormality identified within the liver. There are size is  prominent, 21 cm. The gallbladder wall is not thickened. No gallstones. The common bile duct is dilated, 12 mm. There are intraluminal echoes suggesting sludge or inflammatory debris. IMPRESSION: Mild splenomegaly, 16.1 cm. Hepatopedal flow in the patent splenic vein and portal vein. Liver size is prominent. Common bile duct is dilated, 12 mm with internal echoes, possibly sludge or inflammatory debris.     Micaela Marino PA-C  [facility]

## 2020-01-29 NOTE — PROGRESS NOTES
[facility] Progress Note         Admit Date:  1/27/2020    Today's Date:  1/29/2020    CC:  GI bleed- Melena, ABLA    Subjective:     Patient feels better. He had two black BMs since his EGD on 1/28. His last black BM was at 8pm last night. He has not had a BM today. He denies any ongoing abdominal pain. He denies N/V. He is currently NPO in preparation for an abdominal ultrasound that is planned for later today. Medications:   Current Facility-Administered Medications   Medication Dose Route Frequency    pantoprazole (PROTONIX) tablet 40 mg  40 mg Oral ACB&D    sucralfate (CARAFATE) tablet 1 g  1 g Oral AC&HS    0.9% sodium chloride infusion 250 mL  250 mL IntraVENous PRN    clonazePAM (KlonoPIN) tablet 0.5 mg  0.5 mg Oral DAILY PRN    HYDROcodone-acetaminophen (NORCO)  mg tablet 1 Tab  1 Tab Oral Q4H PRN    levothyroxine (SYNTHROID) tablet 137 mcg  137 mcg Oral 6am    0.9% sodium chloride infusion  75 mL/hr IntraVENous CONTINUOUS    sodium chloride (NS) flush 5-40 mL  5-40 mL IntraVENous PRN    ondansetron (ZOFRAN) injection 4 mg  4 mg IntraVENous Q4H PRN       Review of Systems:  ROS was obtained, with pertinent positives as listed above. No chest pain or SOB. Diet:  NPO    Objective:   Vitals:  Visit Vitals  /63 (BP 1 Location: Right arm, BP Patient Position: At rest)   Pulse 78   Temp 98 °F (36.7 °C)   Resp 18   Ht 5' 10\" (1.778 m)   Wt 94.3 kg (207 lb 12.8 oz)   SpO2 97%   BMI 29.82 kg/m²     Intake/Output:  No intake/output data recorded. 01/27 1901 - 01/29 0700  In: 2084 [P.O.:480; I.V.:1289]  Out: 0   Exam:  General appearance: alert, cooperative, no distress  Lungs: clear to auscultation bilaterally anteriorly  Heart: regular rate and rhythm  Abdomen: soft, non-tender.  Bowel sounds normal. No obvious masses, no organomegaly  Extremities: extremities normal, atraumatic, no cyanosis or edema  Neuro:  alert and oriented    Data Review (Labs):    Recent Labs 01/29/20  0350 01/28/20  1322 01/28/20  0835 01/28/20  0344 01/27/20  1802   WBC 8.6 4.6  --  5.5 8.0   HGB 7.5* 8.2*  --  7.5* 7.8*   HCT 24.4* 27.0*  --  24.5* 25.7*    227  --  212 255   MCV 87.1 87.7  --  86.6 87.7     --   --  144 141   K 4.1  --   --  3.9 3.9   *  --   --  113* 109*   CO2 25  --   --  26 27   BUN 14  --   --  14 14   CREA 0.95  --   --  0.95 1.06   CA 8.0*  --   --  8.0* 8.5     --   --  120* 86   AP  --   --   --   --  109   SGOT  --   --   --   --  37   ALT  --   --   --   --  37   TBILI  --   --   --   --  0.5   ALB  --   --   --   --  3.3*   TP  --   --   --   --  6.2*   LPSE  --   --   --   --  94   PTP  --   --  13.4  --   --    INR  --   --  1.0  --   --          EGD 12/30/19 at [facility] for anemia, melena: \"severe Gastritis. \"  1/6 he was seen by Dr. Aditya Haji with [facility] surgery clinic for ongoing     EGD 1/10/20 with Dr. Aditya Haji with [facility] surgery clinic: severe gastritis but no bleeding and with negative Sally test.   Colonoscopy 1/10/20 multiple colon polyps removed and no active bleeding. EGD 1/28/20 with Dr. Beena Ring for anemia, melena, epigastric pain  PostOp Diagnosis: GAVE, Duodenitis  Findings:   Esophagus: The proximal and mid esophagus appeared normal.  In the distal esophagus, The Z line was normal   Stomach: Severe erythema, and mucosal edema, vascular ectasias in the gastric antrum. 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 A few tiny gastric vascular ectasias in the proximal stomach. Biopsies obtained. ABC was performed in the antrum at 20 kong, 0.8 L and circumferential probe. No evidence of complications. Duodenum:   Nodular, patchy erythema and friability, mild scalloping of the folds. Changes suspicious for celiac disease. Biopsies obtained. Recommendations:   Follow up pathology results  Follow up  CBC   Advance diet    PPI and Carafate for healing of APC/cautery   Consider RFA For additional treatment   Check abdominal ultrasound to evaluate for cirrhosis, splenic vein thrombosis, portal hypertension etc.      Assessment:     Principal Problem:    GI bleed (1/27/2020)    Active Problems:    Thyroid disease (3/22/2017)      Overview: HYPOTHYROIDISM      Depression (3/22/2017)      GERD (gastroesophageal reflux disease) (3/22/2017)      Anemia (1/27/2020)       52 y.o. male with PMH of Depression, chronic pain, seen in GI consultation 1/27 at the request of Dr. Flakito Adam for anemia in pt with complaints of melena and epigastric pain. He was reportedly taking high dose NSAIDs in 11/2019 for presumed LLQ cellulitis. 12/30/19, he presented to the [facility] ER with weakness and labs revealed a decreased Hgb of 4 (then improved to 7.7 after transfusion 3u pRBCs). He had an EGD then and was told he had \"severe Gastritis. \"  1/6 he was seen by Dr. Trent Hutton with [facility] for ongoing melena, weakness, weight loss. 1/10 EGD showed severe gastritis but no bleeding and with negative Sally test. Colonoscopy showed multiple colon polyps removed and no active bleeding. Post procedure he received 2u pRBCs (for Hgb 7.0) with improved Hgb to 9.2. At surgery f/u 1/20 Hgb was 6 and pt received transfusion pRBCs with improved to 9.3 on 1/22. He was now admitted 1/27/20 for anemia with presenting of Hgb 7.8. 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 Plan:     - Follow up pathology results  - Monitor for recurrent GI bleeding. Last melanotic stool was at 8pm last night, ? Residual blood. - Hgb is down this morning from 8.2 to 7.5. I am repeating Hgb now and will follow up results. He is agreeable to additional transfusion pRBCs if needed.     - PPI b.i.d and Carafate for healing of APC/cautery   - Consider RFA For additional treatment   - An abdominal ultrasound is ordered to evaluate for cirrhosis, splenic vein thrombosis, portal hypertension, etc in the setting of his severe GAVE- follow up results.   - He is NPO now for planned ultrasound though afterward, ok to advance diet     Terrance Resendiz PA-C  [facility]

## 2020-01-29 NOTE — PROGRESS NOTES
Initial visit by  to convey care and concern and encourage patient that  services are available if desired. Offered spiritual interventions including prayer during the visit. Mr. Ken Heart is anticipating discharge home today.        Dianne Ramos 68  Board Certified

## 2020-01-30 VITALS
RESPIRATION RATE: 18 BRPM | WEIGHT: 207.8 LBS | BODY MASS INDEX: 29.75 KG/M2 | HEART RATE: 68 BPM | HEIGHT: 70 IN | SYSTOLIC BLOOD PRESSURE: 136 MMHG | TEMPERATURE: 98.2 F | DIASTOLIC BLOOD PRESSURE: 74 MMHG | OXYGEN SATURATION: 100 %

## 2020-01-30 LAB
ABO + RH BLD: NORMAL
ALBUMIN SERPL-MCNC: 3 G/DL (ref 3.5–5)
ALBUMIN/GLOB SERPL: 1 {RATIO} (ref 1.2–3.5)
ALP SERPL-CCNC: 105 U/L (ref 50–136)
ALT SERPL-CCNC: 25 U/L (ref 12–65)
ANION GAP SERPL CALC-SCNC: 6 MMOL/L (ref 7–16)
AST SERPL-CCNC: 25 U/L (ref 15–37)
BILIRUB DIRECT SERPL-MCNC: 0.2 MG/DL
BILIRUB SERPL-MCNC: 0.9 MG/DL (ref 0.2–1.1)
BLD PROD TYP BPU: NORMAL
BLD PROD TYP BPU: NORMAL
BLOOD GROUP ANTIBODIES SERPL: NORMAL
BPU ID: NORMAL
BPU ID: NORMAL
BUN SERPL-MCNC: 16 MG/DL (ref 6–23)
CALCIUM SERPL-MCNC: 8.5 MG/DL (ref 8.3–10.4)
CHLORIDE SERPL-SCNC: 109 MMOL/L (ref 98–107)
CO2 SERPL-SCNC: 26 MMOL/L (ref 21–32)
CREAT SERPL-MCNC: 1.06 MG/DL (ref 0.8–1.5)
CROSSMATCH RESULT,%XM: NORMAL
CROSSMATCH RESULT,%XM: NORMAL
ERYTHROCYTE [DISTWIDTH] IN BLOOD BY AUTOMATED COUNT: 16.4 % (ref 11.9–14.6)
GLOBULIN SER CALC-MCNC: 2.9 G/DL (ref 2.3–3.5)
GLUCOSE SERPL-MCNC: 93 MG/DL (ref 65–100)
HCT VFR BLD AUTO: 27.9 % (ref 41.1–50.3)
HGB BLD-MCNC: 8.5 G/DL (ref 13.6–17.2)
MCH RBC QN AUTO: 26.3 PG (ref 26.1–32.9)
MCHC RBC AUTO-ENTMCNC: 30.5 G/DL (ref 31.4–35)
MCV RBC AUTO: 86.4 FL (ref 79.6–97.8)
NRBC # BLD: 0 K/UL (ref 0–0.2)
PLATELET # BLD AUTO: 230 K/UL (ref 150–450)
PMV BLD AUTO: 10.4 FL (ref 9.4–12.3)
POTASSIUM SERPL-SCNC: 4 MMOL/L (ref 3.5–5.1)
PROT SERPL-MCNC: 5.9 G/DL (ref 6.3–8.2)
RBC # BLD AUTO: 3.23 M/UL (ref 4.23–5.6)
SODIUM SERPL-SCNC: 141 MMOL/L (ref 136–145)
SPECIMEN EXP DATE BLD: NORMAL
STATUS OF UNIT,%ST: NORMAL
STATUS OF UNIT,%ST: NORMAL
UNIT DIVISION, %UDIV: 0
UNIT DIVISION, %UDIV: 0
WBC # BLD AUTO: 7.7 K/UL (ref 4.3–11.1)

## 2020-01-30 PROCEDURE — 99218 HC RM OBSERVATION: CPT

## 2020-01-30 PROCEDURE — 74011250637 HC RX REV CODE- 250/637: Performed by: INTERNAL MEDICINE

## 2020-01-30 PROCEDURE — 85027 COMPLETE CBC AUTOMATED: CPT

## 2020-01-30 PROCEDURE — 80076 HEPATIC FUNCTION PANEL: CPT

## 2020-01-30 PROCEDURE — 80048 BASIC METABOLIC PNL TOTAL CA: CPT

## 2020-01-30 PROCEDURE — 74011250637 HC RX REV CODE- 250/637: Performed by: FAMILY MEDICINE

## 2020-01-30 PROCEDURE — 36415 COLL VENOUS BLD VENIPUNCTURE: CPT

## 2020-01-30 RX ORDER — PANTOPRAZOLE SODIUM 40 MG/1
40 TABLET, DELAYED RELEASE ORAL
Qty: 60 TAB | Refills: 0 | Status: SHIPPED | OUTPATIENT
Start: 2020-01-30 | End: 2020-02-29

## 2020-01-30 RX ORDER — LANOLIN ALCOHOL/MO/W.PET/CERES
325 CREAM (GRAM) TOPICAL 2 TIMES DAILY WITH MEALS
Qty: 60 TAB | Refills: 0 | Status: SHIPPED | OUTPATIENT
Start: 2020-01-30 | End: 2020-02-29

## 2020-01-30 RX ORDER — SUCRALFATE 1 G/1
1 TABLET ORAL
Qty: 120 TAB | Refills: 0 | Status: SHIPPED | OUTPATIENT
Start: 2020-01-30 | End: 2020-02-29

## 2020-01-30 RX ADMIN — SUCRALFATE 1 G: 1 TABLET ORAL at 07:52

## 2020-01-30 RX ADMIN — SUCRALFATE 1 G: 1 TABLET ORAL at 11:25

## 2020-01-30 RX ADMIN — LEVOTHYROXINE SODIUM 137 MCG: 0.11 TABLET ORAL at 05:06

## 2020-01-30 RX ADMIN — FERROUS SULFATE TAB 325 MG (65 MG ELEMENTAL FE) 325 MG: 325 (65 FE) TAB at 07:52

## 2020-01-30 RX ADMIN — PANTOPRAZOLE SODIUM 40 MG: 40 TABLET, DELAYED RELEASE ORAL at 07:52

## 2020-01-30 RX ADMIN — Medication 10 ML: at 05:07

## 2020-01-30 NOTE — PROGRESS NOTES
[facility] Progress Note         Admit Date:  1/27/2020    Today's Date:  1/30/2020    CC:  GI bleed- Melena, ABLA    Subjective:     Patient feels better and is being discharge home. He denies any ongoing GI bleeding, abdominal pain, N/V. Medications:   Current Facility-Administered Medications   Medication Dose Route Frequency    ferrous sulfate tablet 325 mg  1 Tab Oral BID WITH MEALS    0.9% sodium chloride infusion 250 mL  250 mL IntraVENous PRN    pantoprazole (PROTONIX) tablet 40 mg  40 mg Oral ACB&D    sucralfate (CARAFATE) tablet 1 g  1 g Oral AC&HS    0.9% sodium chloride infusion 250 mL  250 mL IntraVENous PRN    clonazePAM (KlonoPIN) tablet 0.5 mg  0.5 mg Oral DAILY PRN    HYDROcodone-acetaminophen (NORCO)  mg tablet 1 Tab  1 Tab Oral Q4H PRN    levothyroxine (SYNTHROID) tablet 137 mcg  137 mcg Oral 6am    0.9% sodium chloride infusion  75 mL/hr IntraVENous CONTINUOUS    sodium chloride (NS) flush 5-40 mL  5-40 mL IntraVENous PRN    ondansetron (ZOFRAN) injection 4 mg  4 mg IntraVENous Q4H PRN       Review of Systems:  ROS was obtained, with pertinent positives as listed above. No chest pain or SOB. Diet:  Regular    Objective:   Vitals:  Visit Vitals  /61 (BP 1 Location: Left arm, BP Patient Position: Head of bed elevated (Comment degrees))   Pulse 71   Temp 98.2 °F (36.8 °C)   Resp 18   Ht 5' 10\" (1.778 m)   Wt 94.3 kg (207 lb 12.8 oz)   SpO2 98%   BMI 29.82 kg/m²     Intake/Output:  No intake/output data recorded. 01/28 1901 - 01/30 0700  In: 889.2 [P.O.:595]  Out: 300 [Urine:300]  Exam:  General appearance: alert, cooperative, no distress  Lungs: clear to auscultation bilaterally anteriorly  Heart: regular rate and rhythm  Abdomen: soft, non-tender.  Bowel sounds normal. No obvious masses, no organomegaly  Extremities: extremities normal, atraumatic, no cyanosis or edema  Neuro:  alert and oriented    Data Review (Labs):    Recent Labs 01/30/20  0404 01/29/20  0906 01/29/20  0350 01/28/20  1322 01/28/20  0835 01/28/20  0344 01/27/20  1802   WBC 7.7  --  8.6 4.6  --  5.5 8.0   HGB 8.5* 7.3* 7.5* 8.2*  --  7.5* 7.8*   HCT 27.9* 24.3* 24.4* 27.0*  --  24.5* 25.7*     --  226 227  --  212 255   MCV 86.4  --  87.1 87.7  --  86.6 87.7     --  143  --   --  144 141   K 4.0  --  4.1  --   --  3.9 3.9   *  --  112*  --   --  113* 109*   CO2 26  --  25  --   --  26 27   BUN 16  --  14  --   --  14 14   CREA 1.06  --  0.95  --   --  0.95 1.06   CA 8.5  --  8.0*  --   --  8.0* 8.5   GLU 93  --  100  --   --  120* 86     --   --   --   --   --  109   SGOT 25  --   --   --   --   --  37   ALT 25  --   --   --   --   --  37   TBILI 0.9  --   --   --   --   --  0.5   CBIL 0.2  --   --   --   --   --   --    ALB 3.0*  --   --   --   --   --  3.3*   TP 5.9*  --   --   --   --   --  6.2*   LPSE  --   --   --   --   --   --  94   PTP  --   --   --   --  13.4  --   --    INR  --   --   --   --  1.0  --   --          EGD 12/30/19 at [facility] for anemia, melena: \"severe Gastritis. \"  1/6 he was seen by Dr. Giovanny Casas with [address] [place] [address] for ongoing     EGD 1/10/20 with Dr. Giovanny Casas with [address] surgery clinic: severe gastritis but no bleeding and with negative Sally test.   Colonoscopy 1/10/20 multiple colon polyps removed and no active bleeding. EGD 1/28/20 with Dr. Gopal Bolden for anemia, melena, epigastric pain  PostOp Diagnosis: GAVE, Duodenitis  Findings:   Esophagus: The proximal and mid esophagus appeared normal.  In the distal esophagus, The Z line was normal   Stomach: Severe erythema, and mucosal edema, vascular ectasias in the gastric antrum. 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 A few tiny gastric vascular ectasias in the proximal stomach. Biopsies obtained. ABC was performed in the antrum at 20 kong, 0.8 L and circumferential probe. No evidence of complications.    Duodenum:   Nodular, patchy erythema and friability, mild scalloping of the folds. Changes suspicious for celiac disease. Biopsies obtained. Recommendations: Follow up pathology results  Follow up  CBC   Advance diet    PPI and Carafate for healing of APC/cautery   Consider RFA For additional treatment   Check abdominal ultrasound to evaluate for cirrhosis, splenic vein thrombosis, portal hypertension etc.   DIAGNOSIS   A: DUODENAL BIOPSIES: FRAGMENTS OF HISTOLOGICALLY UNREMARKABLE SMALL INTESTINE.   B: GASTRIC BIOPSIES: FRAGMENTS OF GASTRIC MUCOSA HAVING CHANGES OF REPAIR. Complete abdominal US 1/29/20 FINDINGS: Pancreas is grossly unremarkable although the tail is partially obscured by overlying bowel gas. . Aorta is normal caliber, 1.9 cm. The IVC is patent. The normal sized spleen has a homogenous echotexture and measures 16.1 cm. Splenic vein is patent. There is hepatopedal flow in the splenic vein. Left kidney is unremarkable without hydronephrosis and measures 11.9 cm. Right kidney is unremarkable without hydronephrosis and measures 11.2 cm. Renal echogenicity is normal, the right kidney is hypoechoic to the liver. The intrahepatic biliary tree is not dilated. There is hepatopetal flow in the portal vein. No gross focal parenchymal abnormality identified within the liver. There are size isprominent, 21 cm. The gallbladder wall is not thickened. No gallstones. The  common bile duct is dilated, 12 mm. There are intraluminal echoes suggesting sludge or inflammatory debris. IMPRESSION: Mild splenomegaly, 16.1 cm. Hepatopedal flow in the patent splenic vein and portal vein. Liver size is prominent. Common bile duct is dilated, 12 mm with internal echoes, possibly sludge or inflammatory debris.     MRCP 1/29/20 FINDINGS:   The common bile duct measures 4 mm diameter. There are no filling defects. Pancreatic duct is normal in caliber. The gallbladder is normal in size and appearance. There are no gallstones. The liver and spleen are enlarged. Liver measures 23 cm in length. Spleen measures 15 cm. No discrete mass is seen in the liver or spleen. Portal vein is patent. There are multiple small periportal and gastrohepatic lymph nodes. Largest measures 12 mm short axis. Pancreas and adrenal glands are unremarkable. There is no renal mass. There is no hydronephrosis. IMPRESSION:  1. No significant bile duct dilatation. 2.  Hepatosplenomegaly        Assessment:     Principal Problem:    GI bleed (1/27/2020)    Active Problems:    Thyroid disease (3/22/2017)      Overview: HYPOTHYROIDISM      Depression (3/22/2017)      GERD (gastroesophageal reflux disease) (3/22/2017)      Anemia (1/27/2020)       52 y.o. male with PMH of Depression, chronic pain, seen in GI consultation 1/27 at the request of Dr. Roswell Bumpers for anemia in pt with complaints of melena and epigastric pain. He was reportedly taking high dose NSAIDs in 11/2019 for presumed LLQ cellulitis. 12/30/19, he presented to the [facility] ER with weakness and labs revealed a decreased Hgb of 4 (then improved to 7.7 after transfusion 3u pRBCs). He had an EGD then and was told he had \"severe Gastritis. \"  1/6 he was seen by Dr. Daria Salgado with [place] [facility] for ongoing melena, weakness, weight loss. 1/10 EGD showed severe gastritis but no bleeding and with negative Sally test. Colonoscopy showed multiple colon polyps removed and no active bleeding. Post procedure he received 2u pRBCs (for Hgb 7.0) with improved Hgb to 9.2. At surgery f/u 1/20 Hgb was 6 and pt received transfusion pRBCs with improved to 9.3 on 1/22. He was now admitted 1/27/20 for anemia with presenting of Hgb 7.8. He received 1u pRBC on admission and 1u pRBCs 1/29 for Hgb that dropped to 7.3. 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 Gastric and Duodenal bx were unremarkable. Complete Abd US showed prominent liver size, CBD 12mm with possible sludge or inflammatory debris.   MRCP did show hepatosplenomegaly but was negative for significant for bile duct dilatation. LFTs remain normal.   Plan:     - GI bleeding as resolved and Hgb is improved/stable following transfusion pRBC 1/29. Continue to f/u H/H. At time of d/c can f/u in outpatient setting.    - PPI b.i.d and Carafate for healing of APC/cautery   - Consider RFA For additional treatment   - From GI standpoint no further intervention is planned in hospital setting. Agree with discharge home and will plan for outpatient hospital f/u with GI in 2-4wks. Our office will call pt to schedule this appointment.   Maria L Sanchez PA-C  [facility]

## 2020-01-30 NOTE — PROGRESS NOTES
0600-END OF SHIFT NOTE:    -pt rested well throughout the shift  -no bms during shift  -has been npo since midnight  -mrcp scheduled for today   -vss, no needs voiced at this time     Intake/Output  01/29 1901 - 01/30 0700  In: 240 [P.O.:240]  Out: 300 [Urine:300]   Voiding: YES  Catheter: NO  Drain:          Stool:  0 occurrences. Stool Assessment  Stool Color: Black (01/28/20 1721)  Stool Appearance: Soft (01/28/20 1721)  Stool Amount: Medium (01/28/20 1721)  Stool Source/Status: Rectum (01/28/20 1721)    Emesis:  0 occurrences. VITAL SIGNS  Patient Vitals for the past 12 hrs:   Temp Pulse Resp BP SpO2   01/30/20 0310 98.3 °F (36.8 °C) 66 18 123/64 99 %   01/29/20 2320 98.5 °F (36.9 °C) 71 20 141/69 98 %   01/29/20 1930 98.5 °F (36.9 °C) 75 18 143/45 98 %       Pain Assessment  Pain 1  Pain Scale 1: Visual (01/30/20 0204)  Pain Intensity 1: 0 (01/30/20 0204)  Patient Stated Pain Goal: 0 (01/30/20 6083)  Pain Reassessment 1: Patient resting w/respiratory rate greater than 10 (01/30/20 0204)    Ambulating  Yes    Additional Information:     Shift report given to oncoming nurse at the bedside.     Rosaura Lindquist RN

## 2020-01-30 NOTE — H&P (VIEW-ONLY)
[facility] Progress Note Admit Date:  1/27/2020 Today's Date:  1/30/2020 CC:  GI bleed- Melena, ABLA Subjective:  
 
Patient feels better and is being discharge home. He denies any ongoing GI bleeding, abdominal pain, N/V. Medications:  
Current Facility-Administered Medications Medication Dose Route Frequency  ferrous sulfate tablet 325 mg  1 Tab Oral BID WITH MEALS  
 0.9% sodium chloride infusion 250 mL  250 mL IntraVENous PRN  pantoprazole (PROTONIX) tablet 40 mg  40 mg Oral ACB&D  
 sucralfate (CARAFATE) tablet 1 g  1 g Oral AC&HS  
 0.9% sodium chloride infusion 250 mL  250 mL IntraVENous PRN  
 clonazePAM (KlonoPIN) tablet 0.5 mg  0.5 mg Oral DAILY PRN  
 HYDROcodone-acetaminophen (NORCO)  mg tablet 1 Tab  1 Tab Oral Q4H PRN  
 levothyroxine (SYNTHROID) tablet 137 mcg  137 mcg Oral 6am  
 0.9% sodium chloride infusion  75 mL/hr IntraVENous CONTINUOUS  
 sodium chloride (NS) flush 5-40 mL  5-40 mL IntraVENous PRN  
 ondansetron (ZOFRAN) injection 4 mg  4 mg IntraVENous Q4H PRN Review of Systems: ROS was obtained, with pertinent positives as listed above. No chest pain or SOB. Diet:  Regular Objective:  
Vitals: 
Visit Vitals /61 (BP 1 Location: Left arm, BP Patient Position: Head of bed elevated (Comment degrees)) Pulse 71 Temp 98.2 °F (36.8 °C) Resp 18 Ht 5' 10\" (1.778 m) Wt 94.3 kg (207 lb 12.8 oz) SpO2 98% BMI 29.82 kg/m² Intake/Output: 
No intake/output data recorded. 01/28 1901 - 01/30 0700 In: 889.2 [W.N.:021] Out: 300 [Urine:300] Exam: 
General appearance: alert, cooperative, no distress Lungs: clear to auscultation bilaterally anteriorly Heart: regular rate and rhythm Abdomen: soft, non-tender. Bowel sounds normal. No obvious masses, no organomegaly Extremities: extremities normal, atraumatic, no cyanosis or edema Neuro:  alert and oriented Data Review (Labs):   
Recent Labs  
  01/30/20 0471 01/29/20 
9771 01/29/20 
0350 01/28/20 
1322 01/28/20 
1584 01/28/20 
0344 01/27/20 
1802 WBC 7.7  --  8.6 4.6  --  5.5 8.0 HGB 8.5* 7.3* 7.5* 8.2*  --  7.5* 7.8* HCT 27.9* 24.3* 24.4* 27.0*  --  24.5* 25.7*  
  --  226 227  --  212 255 MCV 86.4  --  87.1 87.7  --  86.6 87.7   --  143  --   --  144 141  
K 4.0  --  4.1  --   --  3.9 3.9 *  --  112*  --   --  113* 109* CO2 26  --  25  --   --  26 27 BUN 16  --  14  --   --  14 14 CREA 1.06  --  0.95  --   --  0.95 1.06  
CA 8.5  --  8.0*  --   --  8.0* 8.5 GLU 93  --  100  --   --  120* 86   --   --   --   --   --  109 SGOT 25  --   --   --   --   --  37 ALT 25  --   --   --   --   --  37  
TBILI 0.9  --   --   --   --   --  0.5 CBIL 0.2  --   --   --   --   --   --   
ALB 3.0*  --   --   --   --   --  3.3* TP 5.9*  --   --   --   --   --  6.2*  
LPSE  --   --   --   --   --   --  94 PTP  --   --   --   --  13.4  --   --   
INR  --   --   --   --  1.0  --   --   
 
 
 
EGD 12/30/19 at [facility] for anemia, melena: \"severe Gastritis. \"  1/6 he was seen by Dr. Rebecca Pepper with [address] [place] [address] for ongoing EGD 1/10/20 with Dr. Rebecca Pepper with [address] surgery clinic: severe gastritis but no bleeding and with negative Sally test.  
Colonoscopy 1/10/20 multiple colon polyps removed and no active bleeding. EGD 1/28/20 with Dr. Radha Martínez for anemia, melena, epigastric pain PostOp Diagnosis: GAVE, Duodenitis Findings:  
Esophagus: The proximal and mid esophagus appeared normal.  In the distal esophagus, The Z line was normal  
Stomach: Severe erythema, and mucosal edema, vascular ectasias in the gastric antrum. 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 A few tiny gastric vascular ectasias in the proximal stomach. Biopsies obtained. ABC was performed in the antrum at 20 kong, 0.8 L and circumferential probe. No evidence of complications. Duodenum:   Nodular, patchy erythema and friability, mild scalloping of the folds. Changes suspicious for celiac disease. Biopsies obtained. Recommendations: Follow up pathology results Follow up  CBC Advance diet PPI and Carafate for healing of APC/cautery Consider RFA For additional treatment Check abdominal ultrasound to evaluate for cirrhosis, splenic vein thrombosis, portal hypertension etc. 
 DIAGNOSIS  
A: DUODENAL BIOPSIES: FRAGMENTS OF HISTOLOGICALLY UNREMARKABLE SMALL INTESTINE. B: GASTRIC BIOPSIES: FRAGMENTS OF GASTRIC MUCOSA HAVING CHANGES OF REPAIR. Complete abdominal US 1/29/20 FINDINGS: Pancreas is grossly unremarkable although the tail is partially obscured by overlying bowel gas. . Aorta is normal caliber, 1.9 cm. The IVC is patent. The normal sized spleen has a homogenous echotexture and measures 16.1 cm. Splenic vein is patent. There is hepatopedal flow in the splenic vein. Left kidney is unremarkable without hydronephrosis and measures 11.9 cm. Right kidney is unremarkable without hydronephrosis and measures 11.2 cm. Renal echogenicity is normal, the right kidney is hypoechoic to the liver. The intrahepatic biliary tree is not dilated. There is hepatopetal flow in the portal vein. No gross focal parenchymal abnormality identified within the liver. There are size isprominent, 21 cm. The gallbladder wall is not thickened. No gallstones. The  common bile duct is dilated, 12 mm. There are intraluminal echoes suggesting sludge or inflammatory debris. IMPRESSION: Mild splenomegaly, 16.1 cm. Hepatopedal flow in the patent splenic vein and portal vein. Liver size is prominent. Common bile duct is dilated, 12 mm with internal echoes, possibly sludge or inflammatory debris.  
 
MRCP 1/29/20 FINDINGS:  
The common bile duct measures 4 mm diameter. There are no filling defects. Pancreatic duct is normal in caliber.  The gallbladder is normal in size and appearance. There are no gallstones. The liver and spleen are enlarged. Liver measures 23 cm in length. Spleen measures 15 cm. No discrete mass is seen in the liver or spleen. Portal vein is patent. There are multiple small periportal and gastrohepatic lymph nodes. Largest measures 12 mm short axis. Pancreas and adrenal glands are unremarkable. There is no renal mass. There is no hydronephrosis. IMPRESSION:  1. No significant bile duct dilatation. 2.  Hepatosplenomegaly 
  
  
Assessment:  
 
Principal Problem: 
  GI bleed (1/27/2020) Active Problems: 
  Thyroid disease (3/22/2017) Overview: HYPOTHYROIDISM Depression (3/22/2017) GERD (gastroesophageal reflux disease) (3/22/2017) Anemia (1/27/2020) 52 y.o. male with PMH of Depression, chronic pain, seen in GI consultation 1/27 at the request of Dr. Ronaldo Burton for anemia in pt with complaints of melena and epigastric pain. He was reportedly taking high dose NSAIDs in 11/2019 for presumed LLQ cellulitis. 12/30/19, he presented to the [facility] ER with weakness and labs revealed a decreased Hgb of 4 (then improved to 7.7 after transfusion 3u pRBCs). He had an EGD then and was told he had \"severe Gastritis. \"  1/6 he was seen by Dr. Sumit Beckford with [facility] surgery clinic for ongoing melena, weakness, weight loss. 1/10 EGD showed severe gastritis but no bleeding and with negative Sally test. Colonoscopy showed multiple colon polyps removed and no active bleeding. Post procedure he received 2u pRBCs (for Hgb 7.0) with improved Hgb to 9.2. At surgery f/u 1/20 Hgb was 6 and pt received transfusion pRBCs with improved to 9.3 on 1/22. He was now admitted 1/27/20 for anemia with presenting of Hgb 7.8. He received 1u pRBC on admission and 1u pRBCs 1/29 for Hgb that dropped to 7.3. 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 Gastric and Duodenal bx were unremarkable.   Complete Abd US showed prominent liver size, CBD 12mm with possible sludge or inflammatory debris. MRCP did show hepatosplenomegaly but was negative for significant for bile duct dilatation. LFTs remain normal.  
Plan:  
 
- GI bleeding as resolved and Hgb is improved/stable following transfusion pRBC 1/29. Continue to f/u H/H. At time of d/c can f/u in outpatient setting.   
- PPI b.i.d and Carafate for healing of APC/cautery - Consider RFA For additional treatment - From GI standpoint no further intervention is planned in hospital setting. Agree with discharge home and will plan for outpatient hospital f/u with GI in 2-4wks. Our office will call pt to schedule this appointment. Mishel Moran PA-C [facility]

## 2020-01-30 NOTE — DISCHARGE INSTRUCTIONS
If worsened, call your doctor or return to hospital.   When you follow up with your doctor, make sure that your doctor is aware of this admission and review hospital record and follow up on lab or other results for continuity of care. Also, review your medications with your doctor for possible need of adjustment or refills. DISCHARGE SUMMARY from Nurse    PATIENT INSTRUCTIONS:    After general anesthesia or intravenous sedation, for 24 hours or while taking prescription Narcotics:  · Limit your activities  · Do not drive and operate hazardous machinery  · Do not make important personal or business decisions  · Do  not drink alcoholic beverages  · If you have not urinated within 8 hours after discharge, please contact your surgeon on call. Report the following to your surgeon:  · Excessive pain, swelling, redness or odor of or around the surgical area  · Temperature over 100.5  · Nausea and vomiting lasting longer than 4 hours or if unable to take medications  · Any signs of decreased circulation or nerve impairment to extremity: change in color, persistent  numbness, tingling, coldness or increase pain  · Any questions    What to do at Home:  Recommended activity: Activity as tolerated    If you experience any of the following symptoms increased bleeding,abdominal pain, fever, or any questions or concerns, please follow up with [facility], Dr. Titus Barreto or the ED. *  Please give a list of your current medications to your Primary Care Provider. *  Please update this list whenever your medications are discontinued, doses are      changed, or new medications (including over-the-counter products) are added. *  Please carry medication information at all times in case of emergency situations.     These are general instructions for a healthy lifestyle:    No smoking/ No tobacco products/ Avoid exposure to second hand smoke  Surgeon General's Warning:  Quitting smoking now greatly reduces serious risk to your health. Obesity, smoking, and sedentary lifestyle greatly increases your risk for illness    A healthy diet, regular physical exercise & weight monitoring are important for maintaining a healthy lifestyle    You may be retaining fluid if you have a history of heart failure or if you experience any of the following symptoms:  Weight gain of 3 pounds or more overnight or 5 pounds in a week, increased swelling in our hands or feet or shortness of breath while lying flat in bed. Please call your doctor as soon as you notice any of these symptoms; do not wait until your next office visit. The discharge information has been reviewed with the patient and spouse. The patient and spouse verbalized understanding. Discharge medications reviewed with the patient and spouse and appropriate educational materials and side effects teaching were provided.   ___________________________________________________________________________________________________________________________________

## 2020-01-30 NOTE — DISCHARGE SUMMARY
Discharge Summary     Patient: Shelly Hernandez MRN: [id number]  SSN: [id number]    YOB: 1970  Age: 52 y.o. Sex: male       Admit Date: 1/27/2020    Discharge Date: 1/30/2020      Admission Diagnoses: Melena [K92.1]    Discharge Diagnoses:   Problem List as of 1/30/2020 Never Reviewed          Codes Class Noted - Resolved    Melena ICD-10-CM: K92.1  ICD-9-CM: 578.1  1/27/2020 - Present        Anemia ICD-10-CM: D64.9  ICD-9-CM: 285.9  1/27/2020 - Present        * (Principal) GI bleed ICD-10-CM: K92.2  ICD-9-CM: 578.9  1/27/2020 - Present        Thyroid disease ICD-10-CM: E07.9  ICD-9-CM: 246.9  3/22/2017 - Present    Overview Signed 3/22/2017  4:05 PM by Kelechi Jackson ICD-10-CM: F40.240  ICD-9-CM: 300.29  3/22/2017 - Present        Depression ICD-10-CM: F32.9  ICD-9-CM: 236  3/22/2017 - Present        GERD (gastroesophageal reflux disease) ICD-10-CM: K21.9  ICD-9-CM: 530.81  3/22/2017 - Present        High cholesterol ICD-10-CM: E78.00  ICD-9-CM: 272.0  3/22/2017 - Present        Migraines ICD-10-CM: Z04.956  ICD-9-CM: 346.90  3/22/2017 - Present               Discharge Condition: Stable    Hospital Course:     PMH of GI bleeding.      S/P EGD twice and colonoscopy. He was told that he had 9 polyps in the colon. They were all removed. For EGD he was told there was no evidence of sources of bleeding.      Admitted this time because of passing dark-colored stool. He had EGD and was found to have [address] patient denies any other bleeding per rectum since admission. No hematemesis.      No fever. No shaking. No chills. No shortness of breath.       US shows CBD dilation. MRI was done. The findings are shown below. GI saw patient on the day of discharge. No further intervention in hospital. He is to be discharged and followed up with GI.      Physical Exam:      General:                    The patient is a pleasant middle aged male in no acute distress.    Head:                                   Normocephalic/atraumatic. Eyes:                                   palpebral pallor, no scleral icterus. ENT:                                    External auricular and nasal exam within normal limits.                                             LNEFYN membranes are moist.  Neck:                                   Supple, non-tender, no JVD. Lungs:                       Clear to auscultation bilaterally without wheezes or crackles.                                             No respiratory distress or accessory muscle use. Heart:                                  Regular rate and rhythm, without murmurs, rubs, or gallops. Abdomen:                  Soft, non-tender, distended with normoactive bowel sounds. Genitourinary:           No tenderness over the bladder or bilateral CVAs. Extremities:               Without clubbing, cyanosis, or edema. Skin:                                    Normal color, texture, and turgor. No rashes, lesions, or jaundice. Pulses:                      Radial and dorsalis pedis pulses present 2+ bilaterally.                                               Capillary refill <2s. Neurologic:                CN II-XII grossly intact and symmetrical.                                               Moving all four extremities well with no focal deficits. Psychiatric:                Pleasant demeanor, appropriate affect. Alert and oriented x 3    Consults: Gastroenterology    Significant Diagnostic Studies:     EGD findings   1-  Stomach: Severe erythema, and mucosal edema, vascular ectasias in the gastric antrum. 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  A few tiny gastric vascular ectasias in the proximal stomach. Biopsies obtained. ABC was performed in the antrum at 20 kong, 0.8 L and circumferential probe. No evidence of complications.   Duodenum:   Nodular, patchy erythema and friability, mild scalloping of the folds. Changes suspicious for celiac disease. Biopsies obtained.      US abdomen   1-  IMPRESSION: Mild splenomegaly, 16.1 cm. Hepatopedal flow in the patent splenic  vein and portal vein. Liver size is prominent. Common bile duct is dilated, 12  mm with internal echoes, possibly sludge or inflammatory debris.     MRI   1-  IMPRESSION:    1. No significant bile duct dilatation. 2.  Hepatosplenomegaly     Recent Results (from the past 24 hour(s))   METABOLIC PANEL, BASIC    Collection Time: 01/30/20  4:04 AM   Result Value Ref Range    Sodium 141 136 - 145 mmol/L    Potassium 4.0 3.5 - 5.1 mmol/L    Chloride 109 (H) 98 - 107 mmol/L    CO2 26 21 - 32 mmol/L    Anion gap 6 (L) 7 - 16 mmol/L    Glucose 93 65 - 100 mg/dL    BUN 16 6 - 23 MG/DL    Creatinine 1.06 0.8 - 1.5 MG/DL    GFR est AA >60 >60 ml/min/1.73m2    GFR est non-AA >60 >60 ml/min/1.73m2    Calcium 8.5 8.3 - 10.4 MG/DL   CBC W/O DIFF    Collection Time: 01/30/20  4:04 AM   Result Value Ref Range    WBC 7.7 4.3 - 11.1 K/uL    RBC 3.23 (L) 4.23 - 5.6 M/uL    HGB 8.5 (L) 13.6 - 17.2 g/dL    HCT 27.9 (L) 41.1 - 50.3 %    MCV 86.4 79.6 - 97.8 FL    MCH 26.3 26.1 - 32.9 PG    MCHC 30.5 (L) 31.4 - 35.0 g/dL    RDW 16.4 (H) 11.9 - 14.6 %    PLATELET 703 490 - 827 K/uL    MPV 10.4 9.4 - 12.3 FL    ABSOLUTE NRBC 0.00 0.0 - 0.2 K/uL   HEPATIC FUNCTION PANEL    Collection Time: 01/30/20  4:04 AM   Result Value Ref Range    Protein, total 5.9 (L) 6.3 - 8.2 g/dL    Albumin 3.0 (L) 3.5 - 5.0 g/dL    Globulin 2.9 2.3 - 3.5 g/dL    A-G Ratio 1.0 (L) 1.2 - 3.5      Bilirubin, total 0.9 0.2 - 1.1 MG/DL    Bilirubin, direct 0.2 <0.4 MG/DL    Alk.  phosphatase 105 50 - 136 U/L    AST (SGOT) 25 15 - 37 U/L    ALT (SGPT) 25 12 - 65 U/L         Disposition: home    Discharge Medications:   Current Discharge Medication List      START taking these medications    Details   ferrous sulfate 325 mg (65 mg iron) tablet Take 1 Tab by mouth two (2) times daily (with meals) for 30 days.  Qty: 60 Tab, Refills: 0      sucralfate (CARAFATE) 1 gram tablet Take 1 Tab by mouth Before breakfast, lunch, dinner and at bedtime for 30 days. Qty: 120 Tab, Refills: 0         CONTINUE these medications which have CHANGED    Details   pantoprazole (PROTONIX) 40 mg tablet Take 1 Tab by mouth Before breakfast and dinner for 30 days. Qty: 60 Tab, Refills: 0         CONTINUE these medications which have NOT CHANGED    Details   clonazePAM (KLONOPIN) 0.5 mg tablet Take 0.5 mg by mouth.      levothyroxine (SYNTHROID) 137 mcg tablet Take 137 mcg by mouth. STOP taking these medications       HYDROcodone-acetaminophen (NORCO)  mg tablet Comments:   Reason for Stopping:         EPINEPHrine (EPIPEN JR) 0.15 mg/0.3 mL injection Comments:   Reason for Stopping:         albuterol (PROVENTIL HFA, VENTOLIN HFA, PROAIR HFA) 90 mcg/actuation inhaler Comments:   Reason for Stopping:         testosterone cypionate (DEPOTESTOTERONE CYPIONATE) 200 mg/mL injection Comments:   Reason for Stopping:         cyclobenzaprine (FLEXERIL) 10 mg tablet Comments:   Reason for Stopping:         CYCLOBENZAPRINE HCL (CYCLOBENZAPRINE PO) Comments:   Reason for Stopping:         GABAPENTIN PO Comments:   Reason for Stopping:         OXYCODONE HCL (OXYCODONE PO) Comments:   Reason for Stopping:         OTHER Comments:   Reason for Stopping:               Activity: Activity as tolerated  Diet: Low fat, Low cholesterol  Wound Care: Keep wound clean and dry    Follow-up Appointments   Procedures    FOLLOW UP VISIT Appointment in: Other (Specify) See your primary doctor in 3-5 days. See GI as per their recommendation. See your primary doctor in 3-5 days. See GI as per their recommendation. Standing Status:   Standing     Number of Occurrences:   1     Order Specific Question:   Appointment in     Answer: Other (Specify)     I have discussed the plan of care with patient and family members.      Time spent on discharge is 38 minutes.       Signed By: Veatrice Goodpasture, MD     January 30, 2020

## 2020-02-09 ENCOUNTER — ANESTHESIA EVENT (OUTPATIENT)
Dept: ENDOSCOPY | Age: 50
End: 2020-02-09
Payer: COMMERCIAL

## 2020-02-10 ENCOUNTER — ANESTHESIA (OUTPATIENT)
Dept: ENDOSCOPY | Age: 50
End: 2020-02-10
Payer: COMMERCIAL

## 2020-02-10 ENCOUNTER — HOSPITAL ENCOUNTER (OUTPATIENT)
Age: 50
Setting detail: OBSERVATION
Discharge: HOME | End: 2020-02-11
Attending: INTERNAL MEDICINE | Admitting: INTERNAL MEDICINE
Payer: COMMERCIAL

## 2020-02-10 LAB
ALBUMIN SERPL-MCNC: 3.1 G/DL (ref 3.5–5)
ALBUMIN/GLOB SERPL: 1 {RATIO} (ref 1.2–3.5)
ALP SERPL-CCNC: 103 U/L (ref 50–136)
ALT SERPL-CCNC: 29 U/L (ref 12–65)
ANION GAP SERPL CALC-SCNC: 7 MMOL/L (ref 7–16)
APTT PPP: 26.9 SEC (ref 24.3–35.4)
AST SERPL-CCNC: 29 U/L (ref 15–37)
BASOPHILS # BLD: 0.1 K/UL (ref 0–0.2)
BASOPHILS NFR BLD MANUAL: 1 % (ref 0–2)
BILIRUB SERPL-MCNC: 0.6 MG/DL (ref 0.2–1.1)
BUN SERPL-MCNC: 16 MG/DL (ref 6–23)
CALCIUM SERPL-MCNC: 8.2 MG/DL (ref 8.3–10.4)
CHLORIDE SERPL-SCNC: 111 MMOL/L (ref 98–107)
CO2 SERPL-SCNC: 24 MMOL/L (ref 21–32)
CREAT SERPL-MCNC: 1 MG/DL (ref 0.8–1.5)
DIFFERENTIAL METHOD BLD: ABNORMAL
ERYTHROCYTE [DISTWIDTH] IN BLOOD BY AUTOMATED COUNT: 18.6 % (ref 11.9–14.6)
ERYTHROCYTE [DISTWIDTH] IN BLOOD BY AUTOMATED COUNT: 18.6 % (ref 11.9–14.6)
GLOBULIN SER CALC-MCNC: 3 G/DL (ref 2.3–3.5)
GLUCOSE SERPL-MCNC: 98 MG/DL (ref 65–100)
HCT VFR BLD AUTO: 21.3 % (ref 41.1–50.3)
HCT VFR BLD AUTO: 23.4 % (ref 41.1–50.3)
HGB BLD-MCNC: 6.2 G/DL (ref 13.6–17.2)
HGB BLD-MCNC: 6.7 G/DL (ref 13.6–17.2)
LYMPHOCYTES # BLD: 1.1 K/UL (ref 0.5–4.6)
LYMPHOCYTES NFR BLD MANUAL: 17 % (ref 16–44)
MCH RBC QN AUTO: 27.5 PG (ref 26.1–32.9)
MCH RBC QN AUTO: 27.6 PG (ref 26.1–32.9)
MCHC RBC AUTO-ENTMCNC: 28.6 G/DL (ref 31.4–35)
MCHC RBC AUTO-ENTMCNC: 29.1 G/DL (ref 31.4–35)
MCV RBC AUTO: 94.7 FL (ref 79.6–97.8)
MCV RBC AUTO: 95.9 FL (ref 79.6–97.8)
MONOCYTES # BLD: 0.1 K/UL (ref 0.1–1.3)
MONOCYTES NFR BLD MANUAL: 2 % (ref 3–9)
NEUTS SEG # BLD: 5.3 K/UL (ref 1.7–8.2)
NEUTS SEG NFR BLD MANUAL: 80 % (ref 47–75)
NRBC # BLD: 0 K/UL (ref 0–0.2)
NRBC # BLD: 0.02 K/UL (ref 0–0.2)
PLATELET # BLD AUTO: 196 K/UL (ref 150–450)
PLATELET # BLD AUTO: 199 K/UL (ref 150–450)
PLATELET COMMENTS,PCOM: ADEQUATE
PMV BLD AUTO: 10.7 FL (ref 9.4–12.3)
PMV BLD AUTO: 10.7 FL (ref 9.4–12.3)
POTASSIUM SERPL-SCNC: 4.2 MMOL/L (ref 3.5–5.1)
PROT SERPL-MCNC: 6.1 G/DL (ref 6.3–8.2)
RBC # BLD AUTO: 2.25 M/UL (ref 4.23–5.6)
RBC # BLD AUTO: 2.44 M/UL (ref 4.23–5.6)
RBC MORPH BLD: ABNORMAL
RBC MORPH BLD: ABNORMAL
SODIUM SERPL-SCNC: 142 MMOL/L (ref 136–145)
WBC # BLD AUTO: 6.6 K/UL (ref 4.3–11.1)
WBC # BLD AUTO: 7.1 K/UL (ref 4.3–11.1)
WBC MORPH BLD: ABNORMAL

## 2020-02-10 PROCEDURE — 76040000025: Performed by: INTERNAL MEDICINE

## 2020-02-10 PROCEDURE — 36415 COLL VENOUS BLD VENIPUNCTURE: CPT

## 2020-02-10 PROCEDURE — 74011250636 HC RX REV CODE- 250/636: Performed by: INTERNAL MEDICINE

## 2020-02-10 PROCEDURE — P9016 RBC LEUKOCYTES REDUCED: HCPCS

## 2020-02-10 PROCEDURE — 74011250636 HC RX REV CODE- 250/636

## 2020-02-10 PROCEDURE — 74011250636 HC RX REV CODE- 250/636: Performed by: NURSE ANESTHETIST, CERTIFIED REGISTERED

## 2020-02-10 PROCEDURE — 74011250636 HC RX REV CODE- 250/636: Performed by: ANESTHESIOLOGY

## 2020-02-10 PROCEDURE — 99218 HC RM OBSERVATION: CPT

## 2020-02-10 PROCEDURE — 85730 THROMBOPLASTIN TIME PARTIAL: CPT

## 2020-02-10 PROCEDURE — 86900 BLOOD TYPING SEROLOGIC ABO: CPT

## 2020-02-10 PROCEDURE — 86923 COMPATIBILITY TEST ELECTRIC: CPT

## 2020-02-10 PROCEDURE — 36430 TRANSFUSION BLD/BLD COMPNT: CPT

## 2020-02-10 PROCEDURE — 74011250637 HC RX REV CODE- 250/637: Performed by: NURSE PRACTITIONER

## 2020-02-10 PROCEDURE — 76060000032 HC ANESTHESIA 0.5 TO 1 HR: Performed by: INTERNAL MEDICINE

## 2020-02-10 PROCEDURE — 85025 COMPLETE CBC W/AUTO DIFF WBC: CPT

## 2020-02-10 PROCEDURE — 77030022875 HC PRB AR PLSM COAG ERBE -C: Performed by: INTERNAL MEDICINE

## 2020-02-10 PROCEDURE — 74011250636 HC RX REV CODE- 250/636: Performed by: NURSE PRACTITIONER

## 2020-02-10 PROCEDURE — 85027 COMPLETE CBC AUTOMATED: CPT

## 2020-02-10 PROCEDURE — 80053 COMPREHEN METABOLIC PANEL: CPT

## 2020-02-10 PROCEDURE — 77030040361 HC SLV COMPR DVT MDII -B

## 2020-02-10 RX ORDER — ONDANSETRON 2 MG/ML
4 INJECTION INTRAMUSCULAR; INTRAVENOUS
Status: DISCONTINUED | OUTPATIENT
Start: 2020-02-10 | End: 2020-02-11 | Stop reason: HOSPADM

## 2020-02-10 RX ORDER — SODIUM CHLORIDE 0.9 % (FLUSH) 0.9 %
5-40 SYRINGE (ML) INJECTION EVERY 8 HOURS
Status: DISCONTINUED | OUTPATIENT
Start: 2020-02-10 | End: 2020-02-11 | Stop reason: HOSPADM

## 2020-02-10 RX ORDER — HYDROMORPHONE HYDROCHLORIDE 1 MG/ML
INJECTION, SOLUTION INTRAMUSCULAR; INTRAVENOUS; SUBCUTANEOUS
Status: COMPLETED
Start: 2020-02-10 | End: 2020-02-10

## 2020-02-10 RX ORDER — SODIUM CHLORIDE, SODIUM LACTATE, POTASSIUM CHLORIDE, CALCIUM CHLORIDE 600; 310; 30; 20 MG/100ML; MG/100ML; MG/100ML; MG/100ML
125 INJECTION, SOLUTION INTRAVENOUS CONTINUOUS
Status: DISCONTINUED | OUTPATIENT
Start: 2020-02-10 | End: 2020-02-11 | Stop reason: HOSPADM

## 2020-02-10 RX ORDER — SODIUM CHLORIDE, SODIUM LACTATE, POTASSIUM CHLORIDE, CALCIUM CHLORIDE 600; 310; 30; 20 MG/100ML; MG/100ML; MG/100ML; MG/100ML
100 INJECTION, SOLUTION INTRAVENOUS CONTINUOUS
Status: DISCONTINUED | OUTPATIENT
Start: 2020-02-10 | End: 2020-02-11 | Stop reason: HOSPADM

## 2020-02-10 RX ORDER — LANOLIN ALCOHOL/MO/W.PET/CERES
1 CREAM (GRAM) TOPICAL 2 TIMES DAILY WITH MEALS
Status: DISCONTINUED | OUTPATIENT
Start: 2020-02-10 | End: 2020-02-11 | Stop reason: HOSPADM

## 2020-02-10 RX ORDER — SODIUM CHLORIDE 9 MG/ML
250 INJECTION, SOLUTION INTRAVENOUS AS NEEDED
Status: DISCONTINUED | OUTPATIENT
Start: 2020-02-10 | End: 2020-02-11 | Stop reason: HOSPADM

## 2020-02-10 RX ORDER — PROPOFOL 10 MG/ML
INJECTION, EMULSION INTRAVENOUS AS NEEDED
Status: DISCONTINUED | OUTPATIENT
Start: 2020-02-10 | End: 2020-02-10 | Stop reason: HOSPADM

## 2020-02-10 RX ORDER — SODIUM CHLORIDE 0.9 % (FLUSH) 0.9 %
5-40 SYRINGE (ML) INJECTION AS NEEDED
Status: DISCONTINUED | OUTPATIENT
Start: 2020-02-10 | End: 2020-02-11 | Stop reason: HOSPADM

## 2020-02-10 RX ORDER — ACETAMINOPHEN 500 MG
500 TABLET ORAL
Status: DISCONTINUED | OUTPATIENT
Start: 2020-02-10 | End: 2020-02-11 | Stop reason: HOSPADM

## 2020-02-10 RX ORDER — SUCRALFATE 1 G/1
1 TABLET ORAL
Status: DISCONTINUED | OUTPATIENT
Start: 2020-02-10 | End: 2020-02-11 | Stop reason: HOSPADM

## 2020-02-10 RX ORDER — PANTOPRAZOLE SODIUM 40 MG/1
40 TABLET, DELAYED RELEASE ORAL
Status: DISCONTINUED | OUTPATIENT
Start: 2020-02-11 | End: 2020-02-11 | Stop reason: HOSPADM

## 2020-02-10 RX ORDER — ZOLPIDEM TARTRATE 5 MG/1
5 TABLET ORAL
Status: DISCONTINUED | OUTPATIENT
Start: 2020-02-10 | End: 2020-02-10

## 2020-02-10 RX ORDER — HYDROMORPHONE HYDROCHLORIDE 1 MG/ML
0.5 INJECTION, SOLUTION INTRAMUSCULAR; INTRAVENOUS; SUBCUTANEOUS ONCE
Status: COMPLETED | OUTPATIENT
Start: 2020-02-10 | End: 2020-02-10

## 2020-02-10 RX ORDER — ONDANSETRON 2 MG/ML
4 INJECTION INTRAMUSCULAR; INTRAVENOUS ONCE
Status: COMPLETED | OUTPATIENT
Start: 2020-02-10 | End: 2020-02-10

## 2020-02-10 RX ADMIN — ONDANSETRON 4 MG: 2 INJECTION INTRAMUSCULAR; INTRAVENOUS at 11:22

## 2020-02-10 RX ADMIN — SUCRALFATE 1 G: 1 TABLET ORAL at 17:58

## 2020-02-10 RX ADMIN — PROPOFOL 50 MG: 10 INJECTION, EMULSION INTRAVENOUS at 10:39

## 2020-02-10 RX ADMIN — PROPOFOL 50 MG: 10 INJECTION, EMULSION INTRAVENOUS at 10:45

## 2020-02-10 RX ADMIN — HYDROMORPHONE HYDROCHLORIDE 0.5 MG: 1 INJECTION, SOLUTION INTRAMUSCULAR; INTRAVENOUS; SUBCUTANEOUS at 11:22

## 2020-02-10 RX ADMIN — PROPOFOL 50 MG: 10 INJECTION, EMULSION INTRAVENOUS at 10:37

## 2020-02-10 RX ADMIN — PROPOFOL 50 MG: 10 INJECTION, EMULSION INTRAVENOUS at 10:31

## 2020-02-10 RX ADMIN — SODIUM CHLORIDE, SODIUM LACTATE, POTASSIUM CHLORIDE, AND CALCIUM CHLORIDE 125 ML/HR: 600; 310; 30; 20 INJECTION, SOLUTION INTRAVENOUS at 12:00

## 2020-02-10 RX ADMIN — SUCRALFATE 1 G: 1 TABLET ORAL at 21:05

## 2020-02-10 RX ADMIN — Medication 10 ML: at 22:00

## 2020-02-10 RX ADMIN — PROPOFOL 50 MG: 10 INJECTION, EMULSION INTRAVENOUS at 10:34

## 2020-02-10 RX ADMIN — SODIUM CHLORIDE, SODIUM LACTATE, POTASSIUM CHLORIDE, AND CALCIUM CHLORIDE: 600; 310; 30; 20 INJECTION, SOLUTION INTRAVENOUS at 10:08

## 2020-02-10 RX ADMIN — PROPOFOL 50 MG: 10 INJECTION, EMULSION INTRAVENOUS at 10:26

## 2020-02-10 RX ADMIN — ACETAMINOPHEN 500 MG: 500 TABLET, FILM COATED ORAL at 21:05

## 2020-02-10 RX ADMIN — PROPOFOL 50 MG: 10 INJECTION, EMULSION INTRAVENOUS at 10:28

## 2020-02-10 RX ADMIN — Medication 10 ML: at 14:26

## 2020-02-10 RX ADMIN — FERROUS SULFATE TAB 325 MG (65 MG ELEMENTAL FE) 325 MG: 325 (65 FE) TAB at 17:58

## 2020-02-10 RX ADMIN — PROPOFOL 50 MG: 10 INJECTION, EMULSION INTRAVENOUS at 10:42

## 2020-02-10 RX ADMIN — SODIUM CHLORIDE, SODIUM LACTATE, POTASSIUM CHLORIDE, AND CALCIUM CHLORIDE 100 ML/HR: 600; 310; 30; 20 INJECTION, SOLUTION INTRAVENOUS at 11:51

## 2020-02-10 NOTE — PROGRESS NOTES
TRANSFER - IN REPORT:    Verbal report received from [employer], RN(name) on [facility]  being received from GI Lab(unit) for routine progression of care      Report consisted of patients Situation, Background, Assessment and   Recommendations(SBAR). Information from the following report(s) SBAR, Kardex, Procedure Summary, Intake/Output, MAR and Recent Results was reviewed with the receiving nurse. Opportunity for questions and clarification was provided. Assessment completed upon patients arrival to unit and care assumed.

## 2020-02-10 NOTE — PROGRESS NOTES
02/10/20 1400   Dual Skin Pressure Injury Assessment   Dual Skin Pressure Injury Assessment WDL   Second Care Provider (Based on [address] [place] [address]) Tian Sabillon RN       Skin intact.

## 2020-02-10 NOTE — PROGRESS NOTES
TRANSFER - OUT REPORT:    Verbal report given to Alecia Trevino on [facility]  being transferred to Room 237 for routine progression of care       Report consisted of patients Situation, Background, Assessment and   Recommendations(SBAR). Information from the following report(s) SBAR was reviewed with the receiving nurse. Lines:   Peripheral IV 02/10/20 Left Antecubital (Active)   Site Assessment Clean, dry, & intact 2/10/2020 10:40 AM   Phlebitis Assessment 0 2/10/2020 10:40 AM   Infiltration Assessment 0 2/10/2020 10:40 AM   Dressing Status Clean, dry, & intact 2/10/2020 10:40 AM   Dressing Type Transparent;Tape 2/10/2020 10:40 AM   Hub Color/Line Status Pink 2/10/2020 10:40 AM        Opportunity for questions and clarification was provided.       Patient transported with:  Transporter

## 2020-02-10 NOTE — ANESTHESIA POSTPROCEDURE EVALUATION
Procedure(s):  ESOPHAGOGASTRODUODENOSCOPY (EGD)/ 30  ENDOSCOPIC ARGON PLASMA COAGULATION. total IV anesthesia    Anesthesia Post Evaluation      Multimodal analgesia: multimodal analgesia not used between 6 hours prior to anesthesia start to PACU discharge  Patient location during evaluation: bedside  Patient participation: complete - patient participated  Level of consciousness: awake and alert  Pain management: adequate  Airway patency: patent  Anesthetic complications: no  Cardiovascular status: acceptable  Respiratory status: acceptable, spontaneous ventilation and nonlabored ventilation  Hydration status: acceptable  Post anesthesia nausea and vomiting:  none      Vitals Value Taken Time   /59 2/10/2020 11:19 AM   Temp 36.7 °C (98 °F) 2/10/2020 10:52 AM   Pulse 83 2/10/2020 11:27 AM   Resp 16 2/10/2020 11:19 AM   SpO2 99 % 2/10/2020 11:27 AM   Vitals shown include unvalidated device data.

## 2020-02-10 NOTE — ANESTHESIA PREPROCEDURE EVALUATION
Relevant Problems   No relevant active problems       Anesthetic History   No history of anesthetic complications            Review of Systems / Medical History  Patient summary reviewed and pertinent labs reviewed    Pulmonary          Smoker (former)         Neuro/Psych         Headaches     Cardiovascular  Within defined limits                Exercise tolerance: >4 METS     GI/Hepatic/Renal     GERD           Endo/Other      Hypothyroidism: well controlled  Anemia     Other Findings              Physical Exam    Airway  Mallampati: III  TM Distance: 4 - 6 cm  Neck ROM: normal range of motion   Mouth opening: Diminished (comment)     Cardiovascular    Rhythm: regular  Rate: normal         Dental         Pulmonary  Breath sounds clear to auscultation               Abdominal         Other Findings            Anesthetic Plan    ASA: 2  Anesthesia type: total IV anesthesia          Induction: Intravenous  Anesthetic plan and risks discussed with: Patient      Discussed TIVA with its benefits (lower risk of nausea and sore throat, etc.) and risks including possible awareness, patient understands and elects to proceed

## 2020-02-10 NOTE — OP NOTES
Esophagogastroduodenoscopy    DATE of PROCEDURE: 2/10/2020    INDICATION: GI Bleed    POSTPROCEDURE DIAGNOSIS:  OOZing gastritis, suggestive of G.A.V.E.    MEDICATIONS ADMINISTERED: MAC anesthesia (see anesthesia report)    INSTRUMENT: [id number]    PROCEDURE:  After obtaining informed consent, the patient was placed in the left lateral position and sedated. The endoscope was advanced under direct vision without difficulty. The esophagus, stomach (including retroflexed views) and duodenum were evaluated. The patient was taken to the recovery area in stable condition.     FINDINGS:  ESOPHAGUS: normal  STOMACH: Diffusely oozing gastritis, treated with APC, 0.8L flow, 30Watts  DUODENUM: Scallopped duodenitis    Estimated blood loss: 0-minimal   Specimens obtained during procedure: none    PLAN: Check CBC, repeat EGD and APC 2-3 weeks

## 2020-02-11 VITALS
RESPIRATION RATE: 18 BRPM | HEART RATE: 77 BPM | OXYGEN SATURATION: 95 % | BODY MASS INDEX: 29.49 KG/M2 | DIASTOLIC BLOOD PRESSURE: 67 MMHG | WEIGHT: 206 LBS | TEMPERATURE: 97.9 F | HEIGHT: 70 IN | SYSTOLIC BLOOD PRESSURE: 125 MMHG

## 2020-02-11 LAB
ABO + RH BLD: NORMAL
BASOPHILS # BLD: 0.1 K/UL (ref 0–0.2)
BASOPHILS NFR BLD: 1 % (ref 0–2)
BLD PROD TYP BPU: NORMAL
BLD PROD TYP BPU: NORMAL
BLOOD GROUP ANTIBODIES SERPL: NORMAL
BPU ID: NORMAL
BPU ID: NORMAL
CROSSMATCH RESULT,%XM: NORMAL
CROSSMATCH RESULT,%XM: NORMAL
DIFFERENTIAL METHOD BLD: ABNORMAL
EOSINOPHIL # BLD: 0.2 K/UL (ref 0–0.8)
EOSINOPHIL NFR BLD: 2 % (ref 0.5–7.8)
ERYTHROCYTE [DISTWIDTH] IN BLOOD BY AUTOMATED COUNT: 16.8 % (ref 11.9–14.6)
HCT VFR BLD AUTO: 27.7 % (ref 41.1–50.3)
HGB BLD-MCNC: 8.6 G/DL (ref 13.6–17.2)
IMM GRANULOCYTES # BLD AUTO: 0.1 K/UL (ref 0–0.5)
IMM GRANULOCYTES NFR BLD AUTO: 1 % (ref 0–5)
INR PPP: 1.1
LYMPHOCYTES # BLD: 2 K/UL (ref 0.5–4.6)
LYMPHOCYTES NFR BLD: 19 % (ref 13–44)
MCH RBC QN AUTO: 28.5 PG (ref 26.1–32.9)
MCHC RBC AUTO-ENTMCNC: 31 G/DL (ref 31.4–35)
MCV RBC AUTO: 91.7 FL (ref 79.6–97.8)
MONOCYTES # BLD: 0.6 K/UL (ref 0.1–1.3)
MONOCYTES NFR BLD: 5 % (ref 4–12)
NEUTS SEG # BLD: 7.8 K/UL (ref 1.7–8.2)
NEUTS SEG NFR BLD: 73 % (ref 43–78)
NRBC # BLD: 0.02 K/UL (ref 0–0.2)
PLATELET # BLD AUTO: 191 K/UL (ref 150–450)
PMV BLD AUTO: 10.7 FL (ref 9.4–12.3)
PROTHROMBIN TIME: 14.9 SEC (ref 12–14.7)
RBC # BLD AUTO: 3.02 M/UL (ref 4.23–5.6)
SPECIMEN EXP DATE BLD: NORMAL
STATUS OF UNIT,%ST: NORMAL
STATUS OF UNIT,%ST: NORMAL
UNIT DIVISION, %UDIV: 0
UNIT DIVISION, %UDIV: 0
WBC # BLD AUTO: 10.7 K/UL (ref 4.3–11.1)

## 2020-02-11 PROCEDURE — 36415 COLL VENOUS BLD VENIPUNCTURE: CPT

## 2020-02-11 PROCEDURE — 99218 HC RM OBSERVATION: CPT

## 2020-02-11 PROCEDURE — 74011250637 HC RX REV CODE- 250/637: Performed by: NURSE PRACTITIONER

## 2020-02-11 PROCEDURE — 85025 COMPLETE CBC W/AUTO DIFF WBC: CPT

## 2020-02-11 PROCEDURE — 85610 PROTHROMBIN TIME: CPT

## 2020-02-11 RX ADMIN — FERROUS SULFATE TAB 325 MG (65 MG ELEMENTAL FE) 325 MG: 325 (65 FE) TAB at 08:47

## 2020-02-11 RX ADMIN — SUCRALFATE 1 G: 1 TABLET ORAL at 06:46

## 2020-02-11 RX ADMIN — Medication 10 ML: at 06:00

## 2020-02-11 RX ADMIN — SUCRALFATE 1 G: 1 TABLET ORAL at 11:35

## 2020-02-11 RX ADMIN — PANTOPRAZOLE SODIUM 40 MG: 40 TABLET, DELAYED RELEASE ORAL at 06:46

## 2020-02-11 RX ADMIN — LEVOTHYROXINE SODIUM 137 MCG: 0.05 TABLET ORAL at 06:15

## 2020-02-11 NOTE — PROGRESS NOTES
Pt with discharge orders this day. No CM needs noted or voiced at discharge. Milestones met.      Care Management Interventions  Transition of Care Consult (CM Consult): Discharge Planning  Discharge Location  Discharge Placement: Home

## 2020-02-11 NOTE — PROGRESS NOTES
Discharge instructions reviewed with patient. Patient verbalized/ signed agreement/ understanding. Paper copy placed in chart.

## 2020-02-11 NOTE — PROGRESS NOTES
END OF SHIFT NOTE:    INTAKE/OUTPUT  02/10 0701 - 02/11 0700  In: 1140 [I.V.:500]  Out: 906 [Urine:900]  Voiding: YES  Catheter: NO  Drain:              Flatus: Patient does have flatus present. Stool:  0 occurrences. Characteristics:  Stool Assessment  Stool Appearance: Formed, Other (comment)(\"dark color\" per pt)    Emesis: 0 occurrences. Characteristics:        VITAL SIGNS  Patient Vitals for the past 12 hrs:   Temp Pulse Resp BP SpO2   02/11/20 0421 98.1 °F (36.7 °C) 71 18 146/83 97 %   02/10/20 2310 98.2 °F (36.8 °C) 90 18 120/75 98 %   02/10/20 2250 97.3 °F (36.3 °C) 98 19 115/81 96 %   02/10/20 2030 97.7 °F (36.5 °C) 76 16 121/65 97 %   02/10/20 1934 97.8 °F (36.6 °C) 77 17 130/74 97 %   02/10/20 1913 98 °F (36.7 °C) 73 18 125/73 99 %   02/10/20 1856 98.2 °F (36.8 °C) 73 18 125/73 98 %       Pain Assessment  Pain Intensity 1: 5 (02/10/20 2107)  Pain Location 1: Head  Pain Intervention(s) 1: Medication (see MAR)  Patient Stated Pain Goal: 0    Ambulating  Yes    Shift report given to oncoming nurse at the bedside.     Gray Galeano

## 2020-02-11 NOTE — PROGRESS NOTES
END OF SHIFT NOTE:  2nd unit of blood transfusing at this time. Patient alert and oriented x4. Denies any pain. Tolerating clear liquid diet. INTAKE/OUTPUT  No intake/output data recorded. Voiding: YES  Catheter: NO  Drain:              Flatus: Patient does have flatus present. Stool:  0 occurrences. Characteristics:  Stool Assessment  Stool Appearance: Formed, Other (comment)(\"dark color\" per pt)    Emesis: 0 occurrences. Characteristics:        VITAL SIGNS  Patient Vitals for the past 12 hrs:   Temp Pulse Resp BP SpO2   02/10/20 1913 98 °F (36.7 °C) 73 18 125/73 99 %   02/10/20 1856 98.2 °F (36.8 °C) 73 18 125/73 98 %   02/10/20 1753 97.9 °F (36.6 °C) 74 18 121/64 99 %   02/10/20 1635 98.1 °F (36.7 °C) 77 17 120/64 98 %   02/10/20 1614 98.5 °F (36.9 °C) 80 16 130/73 100 %   02/10/20 1350 98.1 °F (36.7 °C) 74 19 121/74 99 %   02/10/20 1324 -- 73 16 122/60 100 %   02/10/20 1240 -- 76 -- 111/56 98 %   02/10/20 1209 -- 76 16 115/53 99 %   02/10/20 1140 -- 76 14 109/53 100 %   02/10/20 1130 -- 76 14 138/62 100 %   02/10/20 1119 -- 81 16 111/59 99 %   02/10/20 1109 -- 76 20 102/53 100 %   02/10/20 1059 -- 76 16 100/56 98 %   02/10/20 1057 -- 78 16 91/50 98 %   02/10/20 1052 98 °F (36.7 °C) 84 16 117/56 99 %   02/10/20 1050 -- 83 16 117/56 99 %   02/10/20 0929 98.1 °F (36.7 °C) 83 18 117/57 100 %       Pain Assessment  Pain Intensity 1: 4 (02/10/20 1324)  Pain Location 1: Abdomen     Patient Stated Pain Goal: 0    Ambulating  Yes    Shift report given to oncoming nurse at the bedside.     Yuniel Faith RN

## 2020-02-11 NOTE — DISCHARGE INSTRUCTIONS
Patient Education        Anemia From Heavy Bleeding: Care Instructions  Your Care Instructions    Anemia means that your body does not have enough red blood cells. Red blood cells carry oxygen around the body. When you have anemia, you may feel dizzy, tired, and weak. You may also feel your heart pounding. For some people, it's hard to focus and think clearly. One common cause of anemia is bleeding. Bleeding from ulcers, hemorrhoids, cancer, or other problems can cause anemia. It may also be caused by heavy menstrual periods. Your treatment may include iron pills. Iron helps your body make hemoglobin. Hemoglobin is the part of the red blood cell that carries oxygen. If you have severe anemia, you may need a blood transfusion to give you red blood cells as quickly as possible. Sometimes it takes several months to get iron levels back to normal.  Follow-up care is a key part of your treatment and safety. Be sure to make and go to all appointments, and call your doctor if you are having problems. It's also a good idea to know your test results and keep a list of the medicines you take. How can you care for yourself at home? · Be safe with medicines. Take your medicines exactly as prescribed. Call your doctor if you think you are having a problem with your medicine. · Follow your doctor's advice about eating foods that have a lot of iron in them. These include red meat, shellfish, poultry, and eggs. They also include beans, raisins, whole-grain bread, and leafy green vegetables. · Steam your vegetables. This is the best way to prepare them if you want to get as much iron as possible. · Iron pills can cause constipation. If you take them, there are things you can do to avoid constipation. Drink plenty of fluids, eat foods with a lot of fiber, and exercise every day. When should you call for help? Call 911 anytime you think you may need emergency care.  For example, call if:    · You passed out (lost consciousness).     · Your stools are maroon or very bloody.    Call your doctor now or seek immediate medical care if:    · You are short of breath.     · You have new or worse bleeding.     · You are dizzy or light-headed, or you feel like you may faint.    Watch closely for changes in your health, and be sure to contact your doctor if:    · You feel weaker or more tired than usual.     · You do not get better as expected. Where can you learn more? Go to http://luciana-valdez.info/. Enter Z055 in the search box to learn more about \"Anemia From Heavy Bleeding: Care Instructions. \"  Current as of: March 28, 2019  Content Version: 12.2  © 6856-1847 [employer]. Care instructions adapted under license by [employer] (which disclaims liability or warranty for this information). If you have questions about a medical condition or this instruction, always ask your healthcare professional. [place] [address] any warranty or liability for your use of this information. DISCHARGE SUMMARY from Nurse    PATIENT INSTRUCTIONS:    After general anesthesia or intravenous sedation, for 24 hours or while taking prescription Narcotics:  · Limit your activities  · Do not drive and operate hazardous machinery  · Do not make important personal or business decisions  · Do  not drink alcoholic beverages  · If you have not urinated within 8 hours after discharge, please contact your surgeon on call. Report the following to your surgeon:  · Excessive pain, swelling, redness or odor of or around the surgical area  · Temperature over 100.5  · Nausea and vomiting lasting longer than 4 hours or if unable to take medications  · Any signs of decreased circulation or nerve impairment to extremity: change in color, persistent  numbness, tingling, coldness or increase pain  · Any questions    What to do at Home:  Recommended activity: Activity as tolerated.     If you experience any of the following symptoms:  Black, bloody or tarry stools,  Uncontrolled pain or nausea/ vomiting,  please follow up with your doctor. *  Please give a list of your current medications to your Primary Care Provider. *  Please update this list whenever your medications are discontinued, doses are      changed, or new medications (including over-the-counter products) are added. *  Please carry medication information at all times in case of emergency situations. These are general instructions for a healthy lifestyle:    No smoking/ No tobacco products/ Avoid exposure to second hand smoke  Surgeon General's Warning:  Quitting smoking now greatly reduces serious risk to your health. Obesity, smoking, and sedentary lifestyle greatly increases your risk for illness    A healthy diet, regular physical exercise & weight monitoring are important for maintaining a healthy lifestyle    You may be retaining fluid if you have a history of heart failure or if you experience any of the following symptoms:  Weight gain of 3 pounds or more overnight or 5 pounds in a week, increased swelling in our hands or feet or shortness of breath while lying flat in bed. Please call your doctor as soon as you notice any of these symptoms; do not wait until your next office visit. The discharge information has been reviewed with the patient. The patient verbalized understanding. Discharge medications reviewed with the patient and appropriate educational materials and side effects teaching were provided.   ___________________________________________________________________________________________________________________________________

## 2020-02-11 NOTE — PROGRESS NOTES
Problem: Falls - Risk of  Goal: *Absence of Falls  Description  Document Arron Hoover Fall Risk and appropriate interventions in the flowsheet.   Outcome: Progressing Towards Goal  Note: Fall Risk Interventions:            Medication Interventions: Teach patient to arise slowly

## 2020-02-11 NOTE — DISCHARGE SUMMARY
[facility] Discharge Summary      Patient ID:  Calvin Adkins  [id number]  25 y.o.  1970    Admit date:  2/10/2020    Discharge date and time:  2/11/2020     Admitting Physician:  Bryson Elmore MD     Discharge Physician: Dr Elisabet Costa    Admission Diagnoses:  Melena [K92.1]  Anemia due to acute blood loss [D62]  Anemia [D64.9]    Discharge Diagnoses: Active Problems:    Anemia (1/27/2020)        Consults:  None    Significant Diagnostic Studies:  Recent Labs     02/11/20  0446 02/10/20  1234 02/10/20  1033   WBC 10.7 6.6 7.1   HGB 8.6* 6.7* 6.2*   HCT 27.7* 23.4* 21.3*    196 199   MCV 91.7 95.9 94.7   NA  --  142  --    K  --  4.2  --    CL  --  111*  --    CO2  --  24  --    BUN  --  16  --    CREA  --  1.00  --    CA  --  8.2*  --    GLU  --  98  --    AP  --  103  --    SGOT  --  29  --    ALT  --  29  --    TBILI  --  0.6  --    ALB  --  3.1*  --    TP  --  6.1*  --    PTP 14.9*  --   --    INR 1.1  --   --    APTT  --  26.9  --         Hospital Course:  Pt presented for EGD with APC 2/10/20. He had ozzing gastritis and scalloped duodenitis. HGB was found to be 6.2 with repeat showing 6.7. He received 2 units of PRBCs and HGB 8.6 today. He is receiving PPI and sucralfate here, continued from home meds. He had some mild nausea this morning, relieved with ice chips. He has had some mild abdominal cramping, which is better with sucralfate. Will discharge home today. Pt is to resume home medications. We will arrange for repeat EGD with APC in 3 weeks. Objective:   Vitals:  Visit Vitals  /67   Pulse 77   Temp 97.9 °F (36.6 °C)   Resp 18   Ht 5' 10\" (1.778 m)   Wt 93.4 kg (206 lb)   SpO2 95%   BMI 29.56 kg/m²     Intake/Output:  No intake/output data recorded.   02/09 1901 - 02/11 0700  In: 1140 [I.V.:500]  Out: 906 [Urine:900]  Exam:  General appearance: alert, cooperative, no distress  Lungs: clear to auscultation bilaterally anteriorly  Heart: regular rate and rhythm  Abdomen: soft, non-tender. Bowel sounds normal. No masses, no organomegaly  Extremities: extremities normal, atraumatic, no cyanosis or edema  Neuro:  alert and oriented    Disposition:  Home    Diet:  Regular    Activity:  As tolerated    Patient Instructions:   Current Discharge Medication List      CONTINUE these medications which have NOT CHANGED    Details   pantoprazole (PROTONIX) 40 mg tablet Take 1 Tab by mouth Before breakfast and dinner for 30 days. Qty: 60 Tab, Refills: 0      ferrous sulfate 325 mg (65 mg iron) tablet Take 1 Tab by mouth two (2) times daily (with meals) for 30 days. Qty: 60 Tab, Refills: 0      sucralfate (CARAFATE) 1 gram tablet Take 1 Tab by mouth Before breakfast, lunch, dinner and at bedtime for 30 days. Qty: 120 Tab, Refills: 0      clonazePAM (KLONOPIN) 0.5 mg tablet Take 0.5 mg by mouth.      levothyroxine (SYNTHROID) 137 mcg tablet Take 137 mcg by mouth. Follow up:  No orders of the defined types were placed in this encounter. Total time discharging patient took greater than 30 minutes.     Signed:  Nurys Lynne NP  2/11/2020  12:57 PM

## 2020-03-04 ENCOUNTER — HOSPITAL ENCOUNTER (OUTPATIENT)
Age: 50
Setting detail: OUTPATIENT SURGERY
Discharge: HOME | End: 2020-03-05
Attending: INTERNAL MEDICINE | Admitting: INTERNAL MEDICINE
Payer: COMMERCIAL

## 2020-03-04 ENCOUNTER — ANESTHESIA EVENT (OUTPATIENT)
Dept: ENDOSCOPY | Age: 50
End: 2020-03-04
Payer: COMMERCIAL

## 2020-03-04 ENCOUNTER — ANESTHESIA (OUTPATIENT)
Dept: ENDOSCOPY | Age: 50
End: 2020-03-04
Payer: COMMERCIAL

## 2020-03-04 VITALS
TEMPERATURE: 98 F | SYSTOLIC BLOOD PRESSURE: 154 MMHG | DIASTOLIC BLOOD PRESSURE: 74 MMHG | HEART RATE: 71 BPM | RESPIRATION RATE: 12 BRPM | OXYGEN SATURATION: 98 %

## 2020-03-04 PROCEDURE — 76040000025: Performed by: INTERNAL MEDICINE

## 2020-03-04 PROCEDURE — 74011250636 HC RX REV CODE- 250/636: Performed by: INTERNAL MEDICINE

## 2020-03-04 PROCEDURE — 76060000031 HC ANESTHESIA FIRST 0.5 HR: Performed by: INTERNAL MEDICINE

## 2020-03-04 PROCEDURE — 74011250636 HC RX REV CODE- 250/636: Performed by: ANESTHESIOLOGY

## 2020-03-04 PROCEDURE — 74011250637 HC RX REV CODE- 250/637: Performed by: INTERNAL MEDICINE

## 2020-03-04 PROCEDURE — 74011000250 HC RX REV CODE- 250: Performed by: ANESTHESIOLOGY

## 2020-03-04 PROCEDURE — 74011250636 HC RX REV CODE- 250/636: Performed by: NURSE ANESTHETIST, CERTIFIED REGISTERED

## 2020-03-04 PROCEDURE — 74011000250 HC RX REV CODE- 250: Performed by: INTERNAL MEDICINE

## 2020-03-04 PROCEDURE — 77030022875 HC PRB AR PLSM COAG ERBE -C: Performed by: INTERNAL MEDICINE

## 2020-03-04 RX ORDER — SCOLOPAMINE TRANSDERMAL SYSTEM 1 MG/1
1 PATCH, EXTENDED RELEASE TRANSDERMAL ONCE
Status: DISCONTINUED | OUTPATIENT
Start: 2020-03-04 | End: 2020-03-06 | Stop reason: HOSPADM

## 2020-03-04 RX ORDER — SODIUM CHLORIDE, SODIUM LACTATE, POTASSIUM CHLORIDE, CALCIUM CHLORIDE 600; 310; 30; 20 MG/100ML; MG/100ML; MG/100ML; MG/100ML
1000 INJECTION, SOLUTION INTRAVENOUS CONTINUOUS
Status: DISCONTINUED | OUTPATIENT
Start: 2020-03-04 | End: 2020-03-06 | Stop reason: HOSPADM

## 2020-03-04 RX ORDER — PROPOFOL 10 MG/ML
INJECTION, EMULSION INTRAVENOUS
Status: DISCONTINUED | OUTPATIENT
Start: 2020-03-04 | End: 2020-03-04 | Stop reason: HOSPADM

## 2020-03-04 RX ORDER — PROPOFOL 10 MG/ML
INJECTION, EMULSION INTRAVENOUS AS NEEDED
Status: DISCONTINUED | OUTPATIENT
Start: 2020-03-04 | End: 2020-03-04 | Stop reason: HOSPADM

## 2020-03-04 RX ADMIN — PROMETHAZINE HYDROCHLORIDE 12.5 MG: 25 INJECTION INTRAMUSCULAR; INTRAVENOUS at 13:36

## 2020-03-04 RX ADMIN — PROMETHAZINE HYDROCHLORIDE 6.25 MG: 25 INJECTION INTRAMUSCULAR; INTRAVENOUS at 13:48

## 2020-03-04 RX ADMIN — PROPOFOL 200 MCG/KG/MIN: 10 INJECTION, EMULSION INTRAVENOUS at 12:07

## 2020-03-04 RX ADMIN — PROMETHAZINE HYDROCHLORIDE 6.25 MG: 25 INJECTION INTRAMUSCULAR; INTRAVENOUS at 13:02

## 2020-03-04 RX ADMIN — SODIUM CHLORIDE, SODIUM LACTATE, POTASSIUM CHLORIDE, AND CALCIUM CHLORIDE 1000 ML: 600; 310; 30; 20 INJECTION, SOLUTION INTRAVENOUS at 11:24

## 2020-03-04 RX ADMIN — PROPOFOL 50 MG: 10 INJECTION, EMULSION INTRAVENOUS at 12:07

## 2020-03-04 NOTE — DISCHARGE INSTRUCTIONS
Gastrointestinal Esophagogastroduodenoscopy (EGD) - Upper Exam Discharge Instructions    1. Call Dr. Rosa Isela Oliveira at [phone] for any problems or questions. 2. Contact the doctor's office for follow up appointment as directed. 3. Medication may cause drowsiness for several hours, therefore:  · Do not drive or operate machinery for remainder of the day. · No alcohol today. · Do not make any important or legal decisions for 24 hours. · Do not sign any legal documents for 24 hours. 5. Ordinarily, you may resume regular diet and activity after exam unless otherwise specified by your physician    6. For mild soreness in your throat you may use Cepacol throat lozenges or warm salt-water gargles as needed. Any additional instructions:      FOLLOW UP EGD 2 WEEKS. Instructions given to Sindy Reynolds and other family members.

## 2020-03-04 NOTE — H&P (VIEW-ONLY)
[facility] H&P (Admission) Date:  3/4/2020 Primary GI Physician:  Geraldine Cheng Chief Complaint:  Gi bleed Subjective:  
 
History of Present Illness:  Patient is a 52 y.o. male with PMH of , who is being admitted for elective EGd. 
 
PMH: 
Past Medical History:  
Diagnosis Date  Claustrophobia 3/22/2017  Depression 3/22/2017  GERD (gastroesophageal reflux disease) 3/22/2017  High cholesterol 3/22/2017  
 HNP (herniated nucleus pulposus), lumbar  Lumbago  Lumbar radiculopathy  Migraines 3/22/2017  Nausea & vomiting  Prostate disease  Scoliosis (and kyphoscoliosis), idiopathic  Thyroid disease 3/22/2017 PSH: 
Past Surgical History:  
Procedure Laterality Date  HX BACK SURGERY    
 HX HEENT    
 SINUS SURGERY  
 HX ORCHIECTOMY POSTATE DISEASE / UNILATERAL  
 HX TONSILLECTOMY Allergies: Allergies Allergen Reactions  Levaquin [Levofloxacin] Unknown (comments) FLUOROQUINOLONES   
 Prednisone Unknown (comments) CORTICOSTEROIDS LIQUID PREDNISONE Home Medications: 
Prior to Admission medications Medication Sig Start Date End Date Taking? Authorizing Provider  
clonazePAM (KLONOPIN) 0.5 mg tablet Take 0.5 mg by mouth. Yes Other, MD Frandy  
levothyroxine (SYNTHROID) 137 mcg tablet Take 137 mcg by mouth. 10/31/19 6/30/20 Yes Other, MD Frandy  
 
 
Hospital Medications: 
Current Facility-Administered Medications Medication Dose Route Frequency  lactated Ringers infusion 1,000 mL  1,000 mL IntraVENous CONTINUOUS Social History: 
Social History Tobacco Use  Smoking status: Former Smoker Packs/day: 1.00 Years: 1.00 Pack years: 1.00 Last attempt to quit: 2019 Years since quittin.1  Smokeless tobacco: Never Used  Tobacco comment: TOBACCO ABUSE Substance Use Topics  Alcohol use: No  
 
 
Pt denies any history of drug use, tattoos, or blood transfusions. Family History: 
Family History Problem Relation Age of Onset  Hypertension Other  Other Other BRAIN ANEURYSM  
 Stroke Other Review of Systems: A detailed 10 system ROS is obtained, with pertinent positives as listed above. All others are negative. Objective:  
 
Physical Exam: 
Vitals: 
Visit Vitals /68 Pulse 77 Temp 97.6 °F (36.4 °C) Resp 18 SpO2 97% Gen:  Pt is alert, cooperative, no acute distress Skin:  Extremities and face reveal no rashes. HEENT: Sclerae anicteric. Extra-occular muscles are intact. No oral ulcers. No abnormal pigmentation of the lips. The neck is supple. Cardiovascular: Regular rate and rhythm. No murmurs, gallops, or rubs. Respiratory:  Comfortable breathing with no accessory muscle use. Clear breath sounds with no wheezes, rales, or rhonchi. GI:  Abdomen nondistended, soft, and nontender. Normal active bowel sounds. No enlargement of the liver or spleen. No masses palpable. Rectal:  Deferred Musculoskeletal:  No pitting edema of the lower legs. Neurological:  Gross memory appears intact. Patient is alert and oriented. Psychiatric:  Mood appears appropriate with judgement intact. Lymphatic:  No cervical or supraclavicular adenopathy. Laboratory: No results for input(s): WBC, HGB, HCT, PLT, MCV, NA, K, CL, CO2, BUN, CREA, CA, MG, GLU, AP, SGOT, GPT, ALT, TBIL, TBILI, CBIL, ALB, TP, AML, LPSE, PTP, INR, APTT, HGBEXT, HCTEXT, PLTEXT, INREXT in the last 72 hours. No lab exists for component: DBIL Assessment: 
  
 
Active Problems: * No active hospital problems. * 
 
 
Plan: 
  
 
GAVE- plan egd with APC

## 2020-03-04 NOTE — ANESTHESIA POSTPROCEDURE EVALUATION
Procedure(s):  ESOPHAGOGASTRODUODENOSCOPY (EGD) W/ APC/ BMI 29  ENDOSCOPIC ARGON PLASMA COAGULATION. total IV anesthesia    Anesthesia Post Evaluation      Multimodal analgesia: multimodal analgesia used between 6 hours prior to anesthesia start to PACU discharge  Patient location during evaluation: PACU  Patient participation: complete - patient participated  Level of consciousness: awake and alert  Pain management: adequate  Airway patency: patent  Anesthetic complications: no  Cardiovascular status: acceptable  Respiratory status: acceptable  Hydration status: acceptable  Comments: Significant issue with post-procedure nausea, responded to iv promethazine and was also given scopolamine patch  Post anesthesia nausea and vomiting:  controlled      Vitals Value Taken Time   /74 3/4/2020  2:10 PM   Temp 36.7 °C (98 °F) 3/4/2020 12:31 PM   Pulse 92 3/4/2020  2:57 PM   Resp 12 3/4/2020  1:49 PM   SpO2 96 % 3/4/2020  2:58 PM   Vitals shown include unvalidated device data.

## 2020-03-04 NOTE — OP NOTES
Esophagogastroduodenoscopy    DATE of PROCEDURE: 3/4/2020    INDICATION:GI bleed    POSTPROCEDURE DIAGNOSIS: Diffuse GAVE    MEDICATIONS ADMINISTERED: MAC anesthesia (see anesthesia report)    INSTRUMENT: [id number]    PROCEDURE:  After obtaining informed consent, the patient was placed in the left lateral position and sedated. The endoscope was advanced under direct vision without difficulty. The esophagus, stomach (including retroflexed views) and duodenum were evaluated. The patient was taken to the recovery area in stable condition.     FINDINGS:  ESOPHAGUS: Normal  STOMACH:Diffuse oozing gastritis, most consistent with GAVE, treated with APC 30 [address], 1L flow  DUODENUM: normal    Estimated blood loss: 0-minimal   Specimens obtained during procedure: none    PLAN: Repeat EGD 2 weeks

## 2020-03-04 NOTE — ANESTHESIA PREPROCEDURE EVALUATION
Relevant Problems   No relevant active problems       Anesthetic History   No history of anesthetic complications            Review of Systems / Medical History  Patient summary reviewed and pertinent labs reviewed    Pulmonary          Smoker (former)         Neuro/Psych         Headaches     Cardiovascular  Within defined limits                Exercise tolerance: >4 METS     GI/Hepatic/Renal     GERD: well controlled          Comments: GI bleed Endo/Other      Hypothyroidism: well controlled  Anemia     Other Findings              Physical Exam    Airway  Mallampati: II  TM Distance: 4 - 6 cm  Neck ROM: normal range of motion   Mouth opening: Diminished (comment)     Cardiovascular    Rhythm: regular  Rate: normal         Dental         Pulmonary  Breath sounds clear to auscultation               Abdominal         Other Findings            Anesthetic Plan    ASA: 2  Anesthesia type: total IV anesthesia          Induction: Intravenous  Anesthetic plan and risks discussed with: Patient      Discussed TIVA with its benefits (lower risk of nausea and sore throat, etc.) and risks including possible awareness, patient understands and elects to proceed

## 2020-03-04 NOTE — ROUTINE PROCESS
Late entry, MD to bedside to speak with the pt and his wife, written and verbal instructions reviewed with the pt and his wife, written instructions taken by the pt's wife, the pt was discharged, no further vomiting, a scopolamine patch was placed behind the pt's right ear per Dr Dominic Estrella order, the pt was discharged by writer to personal car driven by the pt's wife, safety was maintained at all times

## 2020-03-04 NOTE — PROGRESS NOTES
The pt was given second dose of phenergan 6.25 per Dr Valencia Dick nausea with vomiting, the pt has vomited approx. 100 blood tinged sputum. vss remain stable, this is the second time the pt has vomited, he reports feeling better after vomting. Will continue to monitor

## 2020-03-04 NOTE — H&P
[facility] H&P (Admission)        Date:  3/4/2020    Primary GI Physician:  gio    Chief Complaint:  Gi bleed    Subjective:     History of Present Illness:  Patient is a 52 y.o. male with PMH of , who is being admitted for elective EGd.    PMH:  Past Medical History:   Diagnosis Date    Claustrophobia 3/22/2017    Depression 3/22/2017    GERD (gastroesophageal reflux disease) 3/22/2017    High cholesterol 3/22/2017    HNP (herniated nucleus pulposus), lumbar     Lumbago     Lumbar radiculopathy     Migraines 3/22/2017    Nausea & vomiting     Prostate disease     Scoliosis (and kyphoscoliosis), idiopathic     Thyroid disease 3/22/2017       PSH:  Past Surgical History:   Procedure Laterality Date    HX BACK SURGERY      HX HEENT      SINUS SURGERY    HX ORCHIECTOMY      POSTATE DISEASE / UNILATERAL    HX TONSILLECTOMY         Allergies: Allergies   Allergen Reactions    Levaquin [Levofloxacin] Unknown (comments)     FLUOROQUINOLONES     Prednisone Unknown (comments)     CORTICOSTEROIDS LIQUID PREDNISONE       Home Medications:  Prior to Admission medications    Medication Sig Start Date End Date Taking? Authorizing Provider   clonazePAM (KLONOPIN) 0.5 mg tablet Take 0.5 mg by mouth. Yes Other, MD Frandy   levothyroxine (SYNTHROID) 137 mcg tablet Take 137 mcg by mouth. 10/31/19 6/30/20 Yes Other, MD Frandy       Hospital Medications:  Current Facility-Administered Medications   Medication Dose Route Frequency    lactated Ringers infusion 1,000 mL  1,000 mL IntraVENous CONTINUOUS       Social History:  Social History     Tobacco Use    Smoking status: Former Smoker     Packs/day: 1.00     Years: 1.00     Pack years: 1.00     Last attempt to quit: 2019     Years since quittin.1    Smokeless tobacco: Never Used    Tobacco comment: TOBACCO ABUSE   Substance Use Topics    Alcohol use: No       Pt denies any history of drug use, tattoos, or blood transfusions.     Family History:  Family History   Problem Relation Age of Onset    Hypertension Other     Other Other         BRAIN ANEURYSM    Stroke Other        Review of Systems:  A detailed 10 system ROS is obtained, with pertinent positives as listed above. All others are negative. Objective:     Physical Exam:  Vitals:  Visit Vitals  /68   Pulse 77   Temp 97.6 °F (36.4 °C)   Resp 18   SpO2 97%     Gen:  Pt is alert, cooperative, no acute distress  Skin:  Extremities and face reveal no rashes. HEENT: Sclerae anicteric. Extra-occular muscles are intact. No oral ulcers. No abnormal pigmentation of the lips. The neck is supple. Cardiovascular: Regular rate and rhythm. No murmurs, gallops, or rubs. Respiratory:  Comfortable breathing with no accessory muscle use. Clear breath sounds with no wheezes, rales, or rhonchi. GI:  Abdomen nondistended, soft, and nontender. Normal active bowel sounds. No enlargement of the liver or spleen. No masses palpable. Rectal:  Deferred  Musculoskeletal:  No pitting edema of the lower legs. Neurological:  Gross memory appears intact. Patient is alert and oriented. Psychiatric:  Mood appears appropriate with judgement intact. Lymphatic:  No cervical or supraclavicular adenopathy. Laboratory:    No results for input(s): WBC, HGB, HCT, PLT, MCV, NA, K, CL, CO2, BUN, CREA, CA, MG, GLU, AP, SGOT, GPT, ALT, TBIL, TBILI, CBIL, ALB, TP, AML, LPSE, PTP, INR, APTT, HGBEXT, HCTEXT, PLTEXT, INREXT in the last 72 hours. No lab exists for component: DBIL    Assessment:       Active Problems:    * No active hospital problems.  *      Plan:       GAVE- plan egd with APC

## 2020-03-19 ENCOUNTER — ANESTHESIA EVENT (OUTPATIENT)
Dept: ENDOSCOPY | Age: 50
End: 2020-03-19
Payer: COMMERCIAL

## 2020-03-19 RX ORDER — SODIUM CHLORIDE 0.9 % (FLUSH) 0.9 %
5-40 SYRINGE (ML) INJECTION EVERY 8 HOURS
Status: CANCELLED | OUTPATIENT
Start: 2020-03-19

## 2020-03-19 RX ORDER — SODIUM CHLORIDE 0.9 % (FLUSH) 0.9 %
5-40 SYRINGE (ML) INJECTION AS NEEDED
Status: CANCELLED | OUTPATIENT
Start: 2020-03-19

## 2020-03-19 RX ORDER — SODIUM CHLORIDE, SODIUM LACTATE, POTASSIUM CHLORIDE, CALCIUM CHLORIDE 600; 310; 30; 20 MG/100ML; MG/100ML; MG/100ML; MG/100ML
100 INJECTION, SOLUTION INTRAVENOUS CONTINUOUS
Status: CANCELLED | OUTPATIENT
Start: 2020-03-19

## 2020-03-19 NOTE — PROGRESS NOTES
Confirmed appointment with patient's wife, Fifi Silva. Family member made aware of precautionary measures in place.

## 2020-03-20 ENCOUNTER — ANESTHESIA (OUTPATIENT)
Dept: ENDOSCOPY | Age: 50
End: 2020-03-20
Payer: COMMERCIAL

## 2020-03-20 ENCOUNTER — HOSPITAL ENCOUNTER (OUTPATIENT)
Age: 50
Setting detail: OUTPATIENT SURGERY
Discharge: HOME | End: 2020-03-20
Attending: INTERNAL MEDICINE | Admitting: INTERNAL MEDICINE
Payer: COMMERCIAL

## 2020-03-20 VITALS
BODY MASS INDEX: 28.77 KG/M2 | HEIGHT: 70 IN | SYSTOLIC BLOOD PRESSURE: 132 MMHG | WEIGHT: 201 LBS | HEART RATE: 80 BPM | OXYGEN SATURATION: 98 % | RESPIRATION RATE: 18 BRPM | TEMPERATURE: 98.5 F | DIASTOLIC BLOOD PRESSURE: 64 MMHG

## 2020-03-20 PROCEDURE — 74011250636 HC RX REV CODE- 250/636

## 2020-03-20 PROCEDURE — 74011250636 HC RX REV CODE- 250/636: Performed by: NURSE ANESTHETIST, CERTIFIED REGISTERED

## 2020-03-20 PROCEDURE — 76040000025: Performed by: INTERNAL MEDICINE

## 2020-03-20 PROCEDURE — 74011250636 HC RX REV CODE- 250/636: Performed by: ANESTHESIOLOGY

## 2020-03-20 PROCEDURE — 76060000032 HC ANESTHESIA 0.5 TO 1 HR: Performed by: INTERNAL MEDICINE

## 2020-03-20 PROCEDURE — 74011000250 HC RX REV CODE- 250: Performed by: ANESTHESIOLOGY

## 2020-03-20 PROCEDURE — 77030022875 HC PRB AR PLSM COAG ERBE -C: Performed by: INTERNAL MEDICINE

## 2020-03-20 RX ORDER — PROPOFOL 10 MG/ML
INJECTION, EMULSION INTRAVENOUS AS NEEDED
Status: DISCONTINUED | OUTPATIENT
Start: 2020-03-20 | End: 2020-03-20 | Stop reason: HOSPADM

## 2020-03-20 RX ORDER — PROMETHAZINE HYDROCHLORIDE 25 MG/ML
INJECTION, SOLUTION INTRAMUSCULAR; INTRAVENOUS
Status: COMPLETED
Start: 2020-03-20 | End: 2020-03-20

## 2020-03-20 RX ORDER — SUCRALFATE 1 G/1
1 TABLET ORAL
COMMUNITY

## 2020-03-20 RX ORDER — ONDANSETRON 2 MG/ML
4 INJECTION INTRAMUSCULAR; INTRAVENOUS ONCE
Status: COMPLETED | OUTPATIENT
Start: 2020-03-20 | End: 2020-03-20

## 2020-03-20 RX ORDER — SODIUM CHLORIDE, SODIUM LACTATE, POTASSIUM CHLORIDE, CALCIUM CHLORIDE 600; 310; 30; 20 MG/100ML; MG/100ML; MG/100ML; MG/100ML
1000 INJECTION, SOLUTION INTRAVENOUS CONTINUOUS
Status: DISCONTINUED | OUTPATIENT
Start: 2020-03-20 | End: 2020-03-20 | Stop reason: HOSPADM

## 2020-03-20 RX ORDER — PANTOPRAZOLE SODIUM 40 MG/1
40 TABLET, DELAYED RELEASE ORAL DAILY
COMMUNITY

## 2020-03-20 RX ORDER — HYDROCODONE BITARTRATE AND ACETAMINOPHEN 10; 325 MG/1; MG/1
1 TABLET ORAL
COMMUNITY

## 2020-03-20 RX ORDER — ONDANSETRON 2 MG/ML
INJECTION INTRAMUSCULAR; INTRAVENOUS
Status: DISCONTINUED
Start: 2020-03-20 | End: 2020-03-20 | Stop reason: HOSPADM

## 2020-03-20 RX ADMIN — PROPOFOL 50 MG: 10 INJECTION, EMULSION INTRAVENOUS at 14:00

## 2020-03-20 RX ADMIN — PROPOFOL 30 MG: 10 INJECTION, EMULSION INTRAVENOUS at 14:10

## 2020-03-20 RX ADMIN — PROPOFOL 30 MG: 10 INJECTION, EMULSION INTRAVENOUS at 14:08

## 2020-03-20 RX ADMIN — PROPOFOL 40 MG: 10 INJECTION, EMULSION INTRAVENOUS at 14:13

## 2020-03-20 RX ADMIN — PROMETHAZINE HYDROCHLORIDE 12.5 MG: 25 INJECTION INTRAMUSCULAR; INTRAVENOUS at 15:32

## 2020-03-20 RX ADMIN — PROMETHAZINE HYDROCHLORIDE 12.5 MG: 25 INJECTION INTRAMUSCULAR; INTRAVENOUS at 15:11

## 2020-03-20 RX ADMIN — PROPOFOL 30 MG: 10 INJECTION, EMULSION INTRAVENOUS at 14:16

## 2020-03-20 RX ADMIN — PROPOFOL 40 MG: 10 INJECTION, EMULSION INTRAVENOUS at 14:05

## 2020-03-20 RX ADMIN — ONDANSETRON 4 MG: 2 INJECTION INTRAMUSCULAR; INTRAVENOUS at 14:48

## 2020-03-20 RX ADMIN — PROPOFOL 40 MG: 10 INJECTION, EMULSION INTRAVENOUS at 14:03

## 2020-03-20 RX ADMIN — PROPOFOL 30 MG: 10 INJECTION, EMULSION INTRAVENOUS at 14:01

## 2020-03-20 RX ADMIN — SODIUM CHLORIDE, SODIUM LACTATE, POTASSIUM CHLORIDE, AND CALCIUM CHLORIDE 1000 ML: 600; 310; 30; 20 INJECTION, SOLUTION INTRAVENOUS at 12:41

## 2020-03-20 NOTE — INTERVAL H&P NOTE
H&P Update: 
Humphrey Bejarano was seen and examined. He remains appropriate for EGD with APC, for continued active upper GI bleeding

## 2020-03-20 NOTE — OP NOTES
Esophagogastroduodenoscopy    DATE of PROCEDURE: 3/20/2020    INDICATION:GI Bleed, G.A.V.E. POSTPROCEDURE DIAGNOSIS: same    MEDICATIONS ADMINISTERED: MAC anesthesia (see anesthesia report)    INSTRUMENT: [id number]    PROCEDURE:  After obtaining informed consent, the patient was placed in the left lateral position and sedated. The endoscope was advanced under direct vision without difficulty. The esophagus, stomach (including retroflexed views) and duodenum were evaluated. The patient was taken to the recovery area in stable condition.     FINDINGS:  ESOPHAGUS: normal  STOMACH: Diffuse, reticular GAVE, treated with APC circumferential probe 30W/ 0.8L flow  DUODENUM: Normal    Estimated blood loss: 0-minimal   Specimens obtained during procedure: none    PLAN:   Repeat EGD with APC in 2 weeks

## 2020-03-20 NOTE — ANESTHESIA PREPROCEDURE EVALUATION
Relevant Problems   No relevant active problems       Anesthetic History     PONV          Review of Systems / Medical History  Patient summary reviewed and pertinent labs reviewed    Pulmonary          Smoker (former)         Neuro/Psych         Headaches     Cardiovascular  Within defined limits                Exercise tolerance: >4 METS     GI/Hepatic/Renal     GERD: well controlled          Comments: GI bleed Endo/Other      Hypothyroidism: well controlled  Anemia     Other Findings              Physical Exam    Airway  Mallampati: II  TM Distance: 4 - 6 cm  Neck ROM: normal range of motion   Mouth opening: Diminished (comment)     Cardiovascular    Rhythm: regular  Rate: normal         Dental         Pulmonary  Breath sounds clear to auscultation               Abdominal         Other Findings            Anesthetic Plan    ASA: 2  Anesthesia type: total IV anesthesia          Induction: Intravenous  Anesthetic plan and risks discussed with: Patient      Discussed TIVA with its benefits (lower risk of nausea and sore throat, etc.) and risks including possible awareness, patient understands and elects to proceed

## 2020-03-20 NOTE — DISCHARGE INSTRUCTIONS
Gastrointestinal Esophagogastroduodenoscopy (EGD)/ Endoscopic Ultrasound(EUS)- Upper Exam Discharge Instructions    1. Call Dr. Chema Singh  for any problems or questions. 2. Contact the doctor's office for follow up appointment as directed. 3. Medication may cause drowsiness for several hours, therefore, do not drive or operate machinery for remainder of the day. 4. No alcohol today. 5. Do not make any important decisions such as signing legal paperwork. 6. Ordinarily, you may resume regular diet and activity after exam unless otherwise specified by your physician. 7. For mild soreness in your throat you may use Cepacol throat lozenges or warm  salt-water gargles as needed. Any additional instructions:   Repeat EGD in 2 weeks. Instructions given to Clarita Guardado and other family members.

## 2020-03-20 NOTE — ANESTHESIA POSTPROCEDURE EVALUATION
Procedure(s):  ESOPHAGOGASTRODUODENOSCOPY (EGD)    ENDOSCOPIC ARGON PLASMA COAGULATION. total IV anesthesia    Anesthesia Post Evaluation        Patient location during evaluation: PACU  Patient participation: complete - patient participated  Level of consciousness: awake  Pain management: adequate  Airway patency: patent  Anesthetic complications: yes  Specific anesthetic complications: PONV  Respiratory status: acceptable  Hydration status: acceptable  Post anesthesia nausea and vomiting:  controlled      Vitals Value Taken Time   /61 3/20/2020  3:36 PM   Temp     Pulse 81 3/20/2020  3:45 PM   Resp 20 3/20/2020  2:57 PM   SpO2 97 % 3/20/2020  3:45 PM   Vitals shown include unvalidated device data.

## 2020-04-01 ENCOUNTER — ANESTHESIA EVENT (OUTPATIENT)
Dept: ENDOSCOPY | Age: 50
End: 2020-04-01
Payer: COMMERCIAL

## 2020-04-01 NOTE — PROGRESS NOTES
Confirmed arrival time with patient.  will be present. Patient aware of COVID precautions. No concerns noted at this time.

## 2020-04-02 ENCOUNTER — HOSPITAL ENCOUNTER (OUTPATIENT)
Age: 50
Setting detail: OUTPATIENT SURGERY
Discharge: HOME | End: 2020-04-02
Attending: INTERNAL MEDICINE | Admitting: INTERNAL MEDICINE
Payer: COMMERCIAL

## 2020-04-02 ENCOUNTER — ANESTHESIA (OUTPATIENT)
Dept: ENDOSCOPY | Age: 50
End: 2020-04-02
Payer: COMMERCIAL

## 2020-04-02 VITALS
SYSTOLIC BLOOD PRESSURE: 128 MMHG | OXYGEN SATURATION: 99 % | RESPIRATION RATE: 16 BRPM | DIASTOLIC BLOOD PRESSURE: 70 MMHG | TEMPERATURE: 98 F | HEART RATE: 70 BPM

## 2020-04-02 PROCEDURE — 77030022875 HC PRB AR PLSM COAG ERBE -C: Performed by: INTERNAL MEDICINE

## 2020-04-02 PROCEDURE — 88305 TISSUE EXAM BY PATHOLOGIST: CPT

## 2020-04-02 PROCEDURE — 77030021593 HC FCPS BIOP ENDOSC BSC -A: Performed by: INTERNAL MEDICINE

## 2020-04-02 PROCEDURE — 88312 SPECIAL STAINS GROUP 1: CPT

## 2020-04-02 PROCEDURE — 74011250636 HC RX REV CODE- 250/636: Performed by: NURSE ANESTHETIST, CERTIFIED REGISTERED

## 2020-04-02 PROCEDURE — 76040000025: Performed by: INTERNAL MEDICINE

## 2020-04-02 PROCEDURE — 76060000031 HC ANESTHESIA FIRST 0.5 HR: Performed by: INTERNAL MEDICINE

## 2020-04-02 PROCEDURE — 74011250636 HC RX REV CODE- 250/636: Performed by: INTERNAL MEDICINE

## 2020-04-02 PROCEDURE — 74011250636 HC RX REV CODE- 250/636: Performed by: ANESTHESIOLOGY

## 2020-04-02 RX ORDER — SODIUM CHLORIDE, SODIUM LACTATE, POTASSIUM CHLORIDE, CALCIUM CHLORIDE 600; 310; 30; 20 MG/100ML; MG/100ML; MG/100ML; MG/100ML
75 INJECTION, SOLUTION INTRAVENOUS CONTINUOUS
Status: DISCONTINUED | OUTPATIENT
Start: 2020-04-02 | End: 2020-04-02 | Stop reason: HOSPADM

## 2020-04-02 RX ORDER — OXYCODONE AND ACETAMINOPHEN 5; 325 MG/1; MG/1
1 TABLET ORAL AS NEEDED
Status: DISCONTINUED | OUTPATIENT
Start: 2020-04-02 | End: 2020-04-02 | Stop reason: HOSPADM

## 2020-04-02 RX ORDER — FENTANYL CITRATE 50 UG/ML
100 INJECTION, SOLUTION INTRAMUSCULAR; INTRAVENOUS ONCE
Status: DISCONTINUED | OUTPATIENT
Start: 2020-04-02 | End: 2020-04-02 | Stop reason: HOSPADM

## 2020-04-02 RX ORDER — ONDANSETRON 2 MG/ML
4 INJECTION INTRAMUSCULAR; INTRAVENOUS ONCE
Status: COMPLETED | OUTPATIENT
Start: 2020-04-02 | End: 2020-04-02

## 2020-04-02 RX ORDER — HYDROMORPHONE HYDROCHLORIDE 2 MG/ML
0.5 INJECTION, SOLUTION INTRAMUSCULAR; INTRAVENOUS; SUBCUTANEOUS
Status: DISCONTINUED | OUTPATIENT
Start: 2020-04-02 | End: 2020-04-02 | Stop reason: HOSPADM

## 2020-04-02 RX ORDER — NALOXONE HYDROCHLORIDE 0.4 MG/ML
0.2 INJECTION, SOLUTION INTRAMUSCULAR; INTRAVENOUS; SUBCUTANEOUS AS NEEDED
Status: DISCONTINUED | OUTPATIENT
Start: 2020-04-02 | End: 2020-04-02 | Stop reason: HOSPADM

## 2020-04-02 RX ORDER — PROPOFOL 10 MG/ML
INJECTION, EMULSION INTRAVENOUS AS NEEDED
Status: DISCONTINUED | OUTPATIENT
Start: 2020-04-02 | End: 2020-04-02 | Stop reason: HOSPADM

## 2020-04-02 RX ORDER — DEXAMETHASONE SODIUM PHOSPHATE 4 MG/ML
INJECTION, SOLUTION INTRA-ARTICULAR; INTRALESIONAL; INTRAMUSCULAR; INTRAVENOUS; SOFT TISSUE AS NEEDED
Status: DISCONTINUED | OUTPATIENT
Start: 2020-04-02 | End: 2020-04-02 | Stop reason: HOSPADM

## 2020-04-02 RX ORDER — MIDAZOLAM HYDROCHLORIDE 1 MG/ML
2 INJECTION, SOLUTION INTRAMUSCULAR; INTRAVENOUS ONCE
Status: DISCONTINUED | OUTPATIENT
Start: 2020-04-02 | End: 2020-04-02 | Stop reason: HOSPADM

## 2020-04-02 RX ORDER — SODIUM CHLORIDE 0.9 % (FLUSH) 0.9 %
5-40 SYRINGE (ML) INJECTION EVERY 8 HOURS
Status: DISCONTINUED | OUTPATIENT
Start: 2020-04-02 | End: 2020-04-02 | Stop reason: HOSPADM

## 2020-04-02 RX ORDER — SODIUM CHLORIDE 0.9 % (FLUSH) 0.9 %
5-40 SYRINGE (ML) INJECTION AS NEEDED
Status: DISCONTINUED | OUTPATIENT
Start: 2020-04-02 | End: 2020-04-02 | Stop reason: HOSPADM

## 2020-04-02 RX ORDER — LIDOCAINE HYDROCHLORIDE 10 MG/ML
0.1 INJECTION INFILTRATION; PERINEURAL AS NEEDED
Status: DISCONTINUED | OUTPATIENT
Start: 2020-04-02 | End: 2020-04-02 | Stop reason: HOSPADM

## 2020-04-02 RX ORDER — PROMETHAZINE HYDROCHLORIDE 25 MG/ML
INJECTION, SOLUTION INTRAMUSCULAR; INTRAVENOUS AS NEEDED
Status: DISCONTINUED | OUTPATIENT
Start: 2020-04-02 | End: 2020-04-02 | Stop reason: HOSPADM

## 2020-04-02 RX ADMIN — PROMETHAZINE HYDROCHLORIDE 12.5 MG: 25 INJECTION INTRAMUSCULAR; INTRAVENOUS at 10:01

## 2020-04-02 RX ADMIN — PROMETHAZINE HYDROCHLORIDE 12.5 MG: 25 INJECTION INTRAMUSCULAR; INTRAVENOUS at 09:49

## 2020-04-02 RX ADMIN — PROPOFOL 50 MG: 10 INJECTION, EMULSION INTRAVENOUS at 10:01

## 2020-04-02 RX ADMIN — PROPOFOL 50 MG: 10 INJECTION, EMULSION INTRAVENOUS at 09:55

## 2020-04-02 RX ADMIN — PROPOFOL 50 MG: 10 INJECTION, EMULSION INTRAVENOUS at 09:51

## 2020-04-02 RX ADMIN — DEXAMETHASONE SODIUM PHOSPHATE 4 MG: 4 INJECTION, SOLUTION INTRAMUSCULAR; INTRAVENOUS at 09:49

## 2020-04-02 RX ADMIN — PROPOFOL 50 MG: 10 INJECTION, EMULSION INTRAVENOUS at 09:52

## 2020-04-02 RX ADMIN — PROPOFOL 50 MG: 10 INJECTION, EMULSION INTRAVENOUS at 09:59

## 2020-04-02 RX ADMIN — SODIUM CHLORIDE, SODIUM LACTATE, POTASSIUM CHLORIDE, AND CALCIUM CHLORIDE: 600; 310; 30; 20 INJECTION, SOLUTION INTRAVENOUS at 09:42

## 2020-04-02 RX ADMIN — ONDANSETRON 4 MG: 2 INJECTION INTRAMUSCULAR; INTRAVENOUS at 08:45

## 2020-04-02 RX ADMIN — PROPOFOL 50 MG: 10 INJECTION, EMULSION INTRAVENOUS at 09:49

## 2020-04-02 NOTE — H&P
Gastroenterology Outpatient History and Physical    Patient: Avtar Sarabia    Physician: Sade Ang MD    Chief Complaint: Blood loss anemia, melena    History of Present Illness: Severe anemia requiring multiple transfusions, GI bleeding form GAVE    Justification for Procedure: As above    History:  Past Medical History:   Diagnosis Date    Claustrophobia 3/22/2017    Depression 3/22/2017    GERD (gastroesophageal reflux disease) 3/22/2017    High cholesterol 3/22/2017    HNP (herniated nucleus pulposus), lumbar     Lumbago     Lumbar radiculopathy     Migraines 3/22/2017    Nausea & vomiting     Prostate disease     Scoliosis (and kyphoscoliosis), idiopathic     Thyroid disease 3/22/2017      Past Surgical History:   Procedure Laterality Date    HX BACK SURGERY      HX HEENT      SINUS SURGERY    HX ORCHIECTOMY      POSTATE DISEASE / UNILATERAL    HX TONSILLECTOMY        Social History     Socioeconomic History    Marital status:      Spouse name: Not on file    Number of children: Not on file    Years of education: Not on file    Highest education level: Not on file   Tobacco Use    Smoking status: Former Smoker     Packs/day: 1.00     Years: 1.00     Pack years: 1.00     Last attempt to quit: 2019     Years since quittin.2    Smokeless tobacco: Never Used    Tobacco comment: TOBACCO ABUSE   Substance and Sexual Activity    Alcohol use: No      Family History   Problem Relation Age of Onset    Hypertension Other     Other Other         BRAIN ANEURYSM    Stroke Other        Allergies: Allergies   Allergen Reactions    Levaquin [Levofloxacin] Unknown (comments)     FLUOROQUINOLONES     Prednisone Unknown (comments)     CORTICOSTEROIDS LIQUID PREDNISONE       Medications:   Prior to Admission medications    Medication Sig Start Date End Date Taking? Authorizing Provider   sucralfate (Carafate) 1 gram tablet Take 1 g by mouth four (4) times daily. Yes Provider, Historical   pantoprazole (Protonix) 40 mg tablet Take 40 mg by mouth two (2) times a day. Yes Provider, Historical   HYDROcodone-acetaminophen (NORCO)  mg tablet Take 1 Tab by mouth as needed for Pain. Yes Provider, Historical   clonazePAM (KLONOPIN) 0.5 mg tablet Take 0.5 mg by mouth as needed. Yes Other, MD Frandy   levothyroxine (SYNTHROID) 150 mcg tablet Take 150 mcg by mouth. 10/31/19 6/30/20 Yes Other, MD Frandy       Vital Signs: Blood pressure 144/66, pulse 74, temperature 98.6 °F (37 °C), resp. rate 16, SpO2 96 %.     Physical Exam:   General: alert      Heart: regular rate and rhythm   Lungs: no tachypnea, retractions or cyanosis, Heart exam - S1, S2 normal, no murmur, no gallop, rate regular   Abdominal: Bowel sounds are normal, soft, non distended             Plan of Care/Planned Procedure: EGD for bleeding control    Signed:  Cecelia Menendez MD 4/2/2020

## 2020-04-02 NOTE — ANESTHESIA POSTPROCEDURE EVALUATION
Procedure(s):  ESOPHAGOGASTRODUODENOSCOPY (EGD)/ 30  ENDOSCOPIC ARGON PLASMA COAGULATION  ESOPHAGOGASTRODUODENAL (EGD) BIOPSY. total IV anesthesia    Anesthesia Post Evaluation        Patient location during evaluation: PACU  Patient participation: complete - patient participated  Level of consciousness: awake and alert  Pain management: adequate  Airway patency: patent  Anesthetic complications: no  Cardiovascular status: acceptable  Respiratory status: acceptable  Hydration status: acceptable  Post anesthesia nausea and vomiting:  none      Vitals Value Taken Time   /68 4/2/2020 10:25 AM   Temp 36.7 °C (98 °F) 4/2/2020 10:09 AM   Pulse 65 4/2/2020 10:25 AM   Resp 16 4/2/2020 10:19 AM   SpO2 94 % 4/2/2020 10:25 AM   Vitals shown include unvalidated device data.

## 2020-04-02 NOTE — PROCEDURES
EGD Procedure Note    PreOp Diagnosis:   ABLA  GAVE  UGIB  Melena    PostOp Diagnosis:  Severe diffuse gastric and duodenal vascular ectasias     Medications:  Monitored Anesthesia    Procedure:  EGD   Instrument:   After informed consent was obtained, the patient was sedated and the endoscope was inserted  in the mouth and advanced into the duodenum without difficulty. The scope was slowly withdrawn while the mucosa was carefully inspected, including a retroflexed view of the cardia and fundus. Findings:   Esophagus: The proximal and mid esophagus appeared normal.  In the distal esophagus, the Z line is normal.   Stomach: Severe diffuse vascular ectasias throughout the entire stomach. The appearance is somewhat worse than in January. It is now fairly confluent, up 2 the gastric cardia and no longer confined to the distal stomach. This appearance is atypical for GAVE or portal hypertensive gastropathy . Multiple areas with spontaneous oozing identified upon scope insertion. These areas were cauterized with APC on gastric settings, 30 kong, 0.8 L. Multiple cold forceps biopsies obtained in the antrum and body for histologic evaluation. Concerned for possible infiltrative disease   Duodenum:   Moderate diffuse vascular ectasias in the duodenal bulb, less so in the sweep and descending portions. No spontaneous bleeding noted. Recommendations: Follow up pathology results  Continue close followup of hemoglobin, iron infusions and PRBCs as needed   Will discuss further follow up arrangements with primary GI MD, Dr. Iris Shabazz.   Unclear if repeat imaging or surgical evaluation is warranted at this time     Franco Muir MD

## 2020-04-02 NOTE — H&P (VIEW-ONLY)
Gastroenterology Outpatient History and Physical 
 
Patient: Gayle Arzola Physician: Beryl Lantigua MD 
 
Chief Complaint: Blood loss anemia, melena History of Present Illness: Severe anemia requiring multiple transfusions, GI bleeding form GAVE Justification for Procedure: As above History: 
Past Medical History:  
Diagnosis Date  Claustrophobia 3/22/2017  Depression 3/22/2017  GERD (gastroesophageal reflux disease) 3/22/2017  High cholesterol 3/22/2017  
 HNP (herniated nucleus pulposus), lumbar  Lumbago  Lumbar radiculopathy  Migraines 3/22/2017  Nausea & vomiting  Prostate disease  Scoliosis (and kyphoscoliosis), idiopathic  Thyroid disease 3/22/2017 Past Surgical History:  
Procedure Laterality Date  HX BACK SURGERY    
 HX HEENT    
 SINUS SURGERY  
 HX ORCHIECTOMY POSTATE DISEASE / UNILATERAL  
 HX TONSILLECTOMY Social History Socioeconomic History  Marital status:  Spouse name: Not on file  Number of children: Not on file  Years of education: Not on file  Highest education level: Not on file Tobacco Use  Smoking status: Former Smoker Packs/day: 1.00 Years: 1.00 Pack years: 1.00 Last attempt to quit: 2019 Years since quittin.2  Smokeless tobacco: Never Used  Tobacco comment: TOBACCO ABUSE Substance and Sexual Activity  Alcohol use: No  
  
Family History Problem Relation Age of Onset  Hypertension Other  Other Other BRAIN ANEURYSM  
 Stroke Other Allergies: Allergies Allergen Reactions  Levaquin [Levofloxacin] Unknown (comments) FLUOROQUINOLONES   
 Prednisone Unknown (comments) CORTICOSTEROIDS LIQUID PREDNISONE Medications:  
Prior to Admission medications Medication Sig Start Date End Date Taking?  Authorizing Provider  
sucralfate (Carafate) 1 gram tablet Take 1 g by mouth four (4) times daily. Yes Provider, Historical  
pantoprazole (Protonix) 40 mg tablet Take 40 mg by mouth two (2) times a day. Yes Provider, Historical  
HYDROcodone-acetaminophen (NORCO)  mg tablet Take 1 Tab by mouth as needed for Pain. Yes Provider, Historical  
clonazePAM (KLONOPIN) 0.5 mg tablet Take 0.5 mg by mouth as needed. Yes Other, MD Frandy  
levothyroxine (SYNTHROID) 150 mcg tablet Take 150 mcg by mouth. 10/31/19 6/30/20 Yes Other, MD Frandy  
 
 
Vital Signs: Blood pressure 144/66, pulse 74, temperature 98.6 °F (37 °C), resp. rate 16, SpO2 96 %. Physical Exam:  
General: alert Heart: regular rate and rhythm Lungs: no tachypnea, retractions or cyanosis, Heart exam - S1, S2 normal, no murmur, no gallop, rate regular Abdominal: Bowel sounds are normal, soft, non distended Plan of Care/Planned Procedure: EGD for bleeding control Signed: 
Johnnie Robbins MD 4/2/2020

## 2020-04-02 NOTE — ANESTHESIA PREPROCEDURE EVALUATION
Relevant Problems   No relevant active problems       Anesthetic History     PONV          Review of Systems / Medical History  Patient summary reviewed and pertinent labs reviewed    Pulmonary          Smoker (former)         Neuro/Psych         Headaches     Cardiovascular  Within defined limits                Exercise tolerance: >4 METS     GI/Hepatic/Renal     GERD: well controlled          Comments: GI bleed Endo/Other      Hypothyroidism: well controlled  Anemia     Other Findings              Physical Exam    Airway  Mallampati: II  TM Distance: 4 - 6 cm  Neck ROM: normal range of motion   Mouth opening: Diminished (comment)     Cardiovascular    Rhythm: regular  Rate: normal         Dental         Pulmonary  Breath sounds clear to auscultation               Abdominal         Other Findings            Anesthetic Plan    ASA: 2  Anesthesia type: total IV anesthesia          Induction: Intravenous  Anesthetic plan and risks discussed with: Patient

## 2020-04-02 NOTE — DISCHARGE INSTRUCTIONS
Gastrointestinal Esophagogastroduodenoscopy (EGD) - Upper Exam Discharge Instructions    1. Call Dr. Walt Mosley at [phone] for any problems or questions. 2. Contact the doctor's office for follow up appointment as directed. 3. Medication may cause drowsiness for several hours, therefore:  · Do not drive or operate machinery for remainder of the day. · No alcohol today. · Do not make any important or legal decisions for 24 hours. · Do not sign any legal documents for 24 hours. 5. Ordinarily, you may resume regular diet and activity after exam unless otherwise specified by your physician. 6. For mild soreness in your throat you may use Cepacol throat lozenges or warm salt-water gargles as needed. Any additional instructions:    1. Await on pathology  2. Follow up with Doctor Racquel Lakhani given to Wenceslao Becerril and other family members.

## 2020-04-24 NOTE — PROGRESS NOTES
Confirmed appointment with wife. States she knows to be here @ 0900 for 1100 procedure. States she knows to enter hospital at outpatient entrance. Informed wife she must stay in car during procedure, md will call post procedure, and nurse will bring discharge education to car.

## 2020-04-26 ENCOUNTER — ANESTHESIA EVENT (OUTPATIENT)
Dept: ENDOSCOPY | Age: 50
End: 2020-04-26
Payer: COMMERCIAL

## 2020-04-27 ENCOUNTER — ANESTHESIA (OUTPATIENT)
Dept: ENDOSCOPY | Age: 50
End: 2020-04-27
Payer: COMMERCIAL

## 2020-04-27 ENCOUNTER — HOSPITAL ENCOUNTER (OUTPATIENT)
Age: 50
Setting detail: OUTPATIENT SURGERY
Discharge: HOME | End: 2020-04-27
Attending: INTERNAL MEDICINE | Admitting: INTERNAL MEDICINE
Payer: COMMERCIAL

## 2020-04-27 VITALS
TEMPERATURE: 97.7 F | SYSTOLIC BLOOD PRESSURE: 135 MMHG | DIASTOLIC BLOOD PRESSURE: 69 MMHG | RESPIRATION RATE: 14 BRPM | BODY MASS INDEX: 30.4 KG/M2 | HEART RATE: 78 BPM | WEIGHT: 199.96 LBS | OXYGEN SATURATION: 100 %

## 2020-04-27 PROCEDURE — 74011250636 HC RX REV CODE- 250/636: Performed by: ANESTHESIOLOGY

## 2020-04-27 PROCEDURE — 76060000031 HC ANESTHESIA FIRST 0.5 HR: Performed by: INTERNAL MEDICINE

## 2020-04-27 PROCEDURE — 76040000025: Performed by: INTERNAL MEDICINE

## 2020-04-27 PROCEDURE — 74011000250 HC RX REV CODE- 250: Performed by: NURSE ANESTHETIST, CERTIFIED REGISTERED

## 2020-04-27 PROCEDURE — 74011250636 HC RX REV CODE- 250/636: Performed by: NURSE ANESTHETIST, CERTIFIED REGISTERED

## 2020-04-27 PROCEDURE — 76937 US GUIDE VASCULAR ACCESS: CPT

## 2020-04-27 PROCEDURE — 77030022875 HC PRB AR PLSM COAG ERBE -C: Performed by: INTERNAL MEDICINE

## 2020-04-27 RX ORDER — SODIUM CHLORIDE 0.9 % (FLUSH) 0.9 %
5-40 SYRINGE (ML) INJECTION EVERY 8 HOURS
Status: DISCONTINUED | OUTPATIENT
Start: 2020-04-27 | End: 2020-04-27 | Stop reason: HOSPADM

## 2020-04-27 RX ORDER — SODIUM CHLORIDE, SODIUM LACTATE, POTASSIUM CHLORIDE, CALCIUM CHLORIDE 600; 310; 30; 20 MG/100ML; MG/100ML; MG/100ML; MG/100ML
100 INJECTION, SOLUTION INTRAVENOUS CONTINUOUS
Status: DISCONTINUED | OUTPATIENT
Start: 2020-04-27 | End: 2020-04-27 | Stop reason: HOSPADM

## 2020-04-27 RX ORDER — PROPOFOL 10 MG/ML
INJECTION, EMULSION INTRAVENOUS AS NEEDED
Status: DISCONTINUED | OUTPATIENT
Start: 2020-04-27 | End: 2020-04-27 | Stop reason: HOSPADM

## 2020-04-27 RX ORDER — ONDANSETRON 2 MG/ML
INJECTION INTRAMUSCULAR; INTRAVENOUS AS NEEDED
Status: DISCONTINUED | OUTPATIENT
Start: 2020-04-27 | End: 2020-04-27 | Stop reason: HOSPADM

## 2020-04-27 RX ORDER — LIDOCAINE HYDROCHLORIDE 20 MG/ML
INJECTION, SOLUTION EPIDURAL; INFILTRATION; INTRACAUDAL; PERINEURAL AS NEEDED
Status: DISCONTINUED | OUTPATIENT
Start: 2020-04-27 | End: 2020-04-27 | Stop reason: HOSPADM

## 2020-04-27 RX ORDER — SODIUM CHLORIDE 0.9 % (FLUSH) 0.9 %
5-40 SYRINGE (ML) INJECTION AS NEEDED
Status: DISCONTINUED | OUTPATIENT
Start: 2020-04-27 | End: 2020-04-27 | Stop reason: HOSPADM

## 2020-04-27 RX ORDER — MIDAZOLAM HYDROCHLORIDE 1 MG/ML
2 INJECTION, SOLUTION INTRAMUSCULAR; INTRAVENOUS
Status: DISCONTINUED | OUTPATIENT
Start: 2020-04-27 | End: 2020-04-27 | Stop reason: HOSPADM

## 2020-04-27 RX ORDER — PROMETHAZINE HYDROCHLORIDE 25 MG/ML
INJECTION, SOLUTION INTRAMUSCULAR; INTRAVENOUS AS NEEDED
Status: DISCONTINUED | OUTPATIENT
Start: 2020-04-27 | End: 2020-04-27 | Stop reason: HOSPADM

## 2020-04-27 RX ORDER — MIDAZOLAM HYDROCHLORIDE 1 MG/ML
INJECTION, SOLUTION INTRAMUSCULAR; INTRAVENOUS AS NEEDED
Status: DISCONTINUED | OUTPATIENT
Start: 2020-04-27 | End: 2020-04-27 | Stop reason: HOSPADM

## 2020-04-27 RX ADMIN — PROPOFOL 30 MG: 10 INJECTION, EMULSION INTRAVENOUS at 10:50

## 2020-04-27 RX ADMIN — PROPOFOL 30 MG: 10 INJECTION, EMULSION INTRAVENOUS at 10:48

## 2020-04-27 RX ADMIN — PROMETHAZINE HYDROCHLORIDE 12.5 MG: 25 INJECTION INTRAMUSCULAR; INTRAVENOUS at 10:46

## 2020-04-27 RX ADMIN — PROPOFOL 30 MG: 10 INJECTION, EMULSION INTRAVENOUS at 10:52

## 2020-04-27 RX ADMIN — LIDOCAINE HYDROCHLORIDE 60 MG: 20 INJECTION, SOLUTION EPIDURAL; INFILTRATION; INTRACAUDAL; PERINEURAL at 10:46

## 2020-04-27 RX ADMIN — PROPOFOL 40 MG: 10 INJECTION, EMULSION INTRAVENOUS at 10:46

## 2020-04-27 RX ADMIN — PROPOFOL 20 MG: 10 INJECTION, EMULSION INTRAVENOUS at 10:57

## 2020-04-27 RX ADMIN — MIDAZOLAM 2 MG: 1 INJECTION INTRAMUSCULAR; INTRAVENOUS at 10:46

## 2020-04-27 RX ADMIN — PROPOFOL 30 MG: 10 INJECTION, EMULSION INTRAVENOUS at 10:59

## 2020-04-27 RX ADMIN — PROMETHAZINE HYDROCHLORIDE 12.5 MG: 25 INJECTION INTRAMUSCULAR; INTRAVENOUS at 11:04

## 2020-04-27 RX ADMIN — PROPOFOL 10 MG: 10 INJECTION, EMULSION INTRAVENOUS at 11:00

## 2020-04-27 RX ADMIN — PROPOFOL 30 MG: 10 INJECTION, EMULSION INTRAVENOUS at 10:56

## 2020-04-27 RX ADMIN — SODIUM CHLORIDE, SODIUM LACTATE, POTASSIUM CHLORIDE, AND CALCIUM CHLORIDE 100 ML/HR: 600; 310; 30; 20 INJECTION, SOLUTION INTRAVENOUS at 10:20

## 2020-04-27 RX ADMIN — PROPOFOL 30 MG: 10 INJECTION, EMULSION INTRAVENOUS at 10:54

## 2020-04-27 RX ADMIN — ONDANSETRON 4 MG: 2 INJECTION INTRAMUSCULAR; INTRAVENOUS at 10:55

## 2020-04-27 NOTE — INTERVAL H&P NOTE
Update History & Physical 
 
The Patient's History and Physical of 04/02/20,  
 was reviewed with the patient and I examined the patient. There was no change}. The surgical site was confirmed by the patient and me. Plan:  The risk, benefits, expected outcome, and alternative to the recommended procedure have been discussed with the patient. Patient understands and wants to proceed with the procedure.  
 
Electronically signed by Birgit Jarquin MD on 4/27/2020 at 10:37 AM

## 2020-04-27 NOTE — DISCHARGE INSTRUCTIONS
Gastrointestinal Esophagogastroduodenoscopy (EGD) - Upper Exam Discharge Instructions    1. Call Dr. Guille Phillips at [phone] for any problems or questions. 2. Contact the doctor's office for follow up appointment as directed. 3. Medication may cause drowsiness for several hours, therefore:  · Do not drive or operate machinery for remainder of the day. · No alcohol today. · Do not make any important or legal decisions for 24 hours. · Do not sign any legal documents for 24 hours. 5. Ordinarily, you may resume regular diet and activity after exam unless otherwise specified by your physician. 6. For mild soreness in your throat you may use Cepacol throat lozenges or warm salt-water gargles as needed. Any additional instructions:  Next EGD in two weeks. Instructions given to Giselamakaylastephanie Caba and other family members.

## 2020-04-27 NOTE — ANESTHESIA PREPROCEDURE EVALUATION
Relevant Problems   No relevant active problems       Anesthetic History     PONV          Review of Systems / Medical History  Patient summary reviewed and pertinent labs reviewed    Pulmonary          Smoker (former)         Neuro/Psych         Headaches     Cardiovascular  Within defined limits                Exercise tolerance: >4 METS     GI/Hepatic/Renal     GERD: well controlled          Comments: GI bleed Endo/Other      Hypothyroidism: well controlled  Anemia     Other Findings              Physical Exam    Airway  Mallampati: II  TM Distance: 4 - 6 cm  Neck ROM: normal range of motion   Mouth opening: Diminished (comment)     Cardiovascular    Rhythm: regular  Rate: normal         Dental         Pulmonary  Breath sounds clear to auscultation               Abdominal         Other Findings            Anesthetic Plan    ASA: 3  Anesthesia type: total IV anesthesia          Induction: Intravenous  Anesthetic plan and risks discussed with: Patient

## 2020-04-27 NOTE — ANESTHESIA POSTPROCEDURE EVALUATION
Procedure(s):  ESOPHAGOGASTRODUODENOSCOPY (EGD) WITH APC BMI 29  ENDOSCOPIC ARGON PLASMA COAGULATION. total IV anesthesia    Anesthesia Post Evaluation        Patient location during evaluation: PACU  Patient participation: complete - patient participated  Level of consciousness: awake and alert  Pain management: adequate  Airway patency: patent  Anesthetic complications: no  Cardiovascular status: acceptable  Respiratory status: acceptable  Hydration status: acceptable  Post anesthesia nausea and vomiting:  none      Vitals Value Taken Time   /69 4/27/2020 11:38 AM   Temp 36.5 °C (97.7 °F) 4/27/2020 11:09 AM   Pulse 90 4/27/2020 11:42 AM   Resp 14 4/27/2020 11:38 AM   SpO2 99 % 4/27/2020 11:42 AM   Vitals shown include unvalidated device data.

## 2020-04-27 NOTE — ROUTINE PROCESS
Due to the COVID-19 crisis, driving companions have been asked to stay in vehicle while patient has procedure performed. Discharge instructions have been reviewed with patient and  via phone call. Signature pad not available for  to sign.

## 2020-04-27 NOTE — PROGRESS NOTES
Asked to start a PIV. Using U/S assistance, on second attempt, placed a 20g 1 3/4\" PIV in right forearm. Primary RN informed.   Livia Abdul, RN, VAT

## 2020-05-27 LAB — SARS-COV-2, NAA: NOT DETECTED

## 2020-06-01 ENCOUNTER — ANESTHESIA (OUTPATIENT)
Dept: ENDOSCOPY | Age: 50
End: 2020-06-01
Payer: COMMERCIAL

## 2020-06-01 ENCOUNTER — ANESTHESIA EVENT (OUTPATIENT)
Dept: ENDOSCOPY | Age: 50
End: 2020-06-01
Payer: COMMERCIAL

## 2020-06-01 ENCOUNTER — HOSPITAL ENCOUNTER (OUTPATIENT)
Age: 50
Setting detail: OUTPATIENT SURGERY
Discharge: HOME | End: 2020-06-01
Attending: INTERNAL MEDICINE | Admitting: INTERNAL MEDICINE
Payer: COMMERCIAL

## 2020-06-01 VITALS
HEIGHT: 69 IN | WEIGHT: 206 LBS | TEMPERATURE: 98 F | RESPIRATION RATE: 16 BRPM | OXYGEN SATURATION: 98 % | BODY MASS INDEX: 30.51 KG/M2 | HEART RATE: 73 BPM | DIASTOLIC BLOOD PRESSURE: 77 MMHG | SYSTOLIC BLOOD PRESSURE: 139 MMHG

## 2020-06-01 PROCEDURE — 74011250636 HC RX REV CODE- 250/636: Performed by: NURSE ANESTHETIST, CERTIFIED REGISTERED

## 2020-06-01 PROCEDURE — 77030022875 HC PRB AR PLSM COAG ERBE -C: Performed by: INTERNAL MEDICINE

## 2020-06-01 PROCEDURE — 74011250636 HC RX REV CODE- 250/636: Performed by: INTERNAL MEDICINE

## 2020-06-01 PROCEDURE — 76040000025: Performed by: INTERNAL MEDICINE

## 2020-06-01 PROCEDURE — 76060000032 HC ANESTHESIA 0.5 TO 1 HR: Performed by: INTERNAL MEDICINE

## 2020-06-01 RX ORDER — MIDAZOLAM HYDROCHLORIDE 1 MG/ML
INJECTION, SOLUTION INTRAMUSCULAR; INTRAVENOUS AS NEEDED
Status: DISCONTINUED | OUTPATIENT
Start: 2020-06-01 | End: 2020-06-01 | Stop reason: HOSPADM

## 2020-06-01 RX ORDER — SODIUM CHLORIDE, SODIUM LACTATE, POTASSIUM CHLORIDE, CALCIUM CHLORIDE 600; 310; 30; 20 MG/100ML; MG/100ML; MG/100ML; MG/100ML
1000 INJECTION, SOLUTION INTRAVENOUS CONTINUOUS
Status: DISCONTINUED | OUTPATIENT
Start: 2020-06-01 | End: 2020-06-01 | Stop reason: HOSPADM

## 2020-06-01 RX ORDER — PROPOFOL 10 MG/ML
INJECTION, EMULSION INTRAVENOUS AS NEEDED
Status: DISCONTINUED | OUTPATIENT
Start: 2020-06-01 | End: 2020-06-01 | Stop reason: HOSPADM

## 2020-06-01 RX ORDER — MIDAZOLAM HYDROCHLORIDE 1 MG/ML
INJECTION, SOLUTION INTRAMUSCULAR; INTRAVENOUS
Status: COMPLETED
Start: 2020-06-01 | End: 2020-06-01

## 2020-06-01 RX ORDER — ONDANSETRON 2 MG/ML
INJECTION INTRAMUSCULAR; INTRAVENOUS AS NEEDED
Status: DISCONTINUED | OUTPATIENT
Start: 2020-06-01 | End: 2020-06-01 | Stop reason: HOSPADM

## 2020-06-01 RX ORDER — PROMETHAZINE HYDROCHLORIDE 25 MG/ML
INJECTION, SOLUTION INTRAMUSCULAR; INTRAVENOUS AS NEEDED
Status: DISCONTINUED | OUTPATIENT
Start: 2020-06-01 | End: 2020-06-01 | Stop reason: HOSPADM

## 2020-06-01 RX ORDER — PROPOFOL 10 MG/ML
INJECTION, EMULSION INTRAVENOUS
Status: DISCONTINUED | OUTPATIENT
Start: 2020-06-01 | End: 2020-06-01 | Stop reason: HOSPADM

## 2020-06-01 RX ADMIN — PROMETHAZINE HYDROCHLORIDE 6.25 MG: 25 INJECTION INTRAMUSCULAR; INTRAVENOUS at 12:03

## 2020-06-01 RX ADMIN — ONDANSETRON 4 MG: 2 INJECTION INTRAMUSCULAR; INTRAVENOUS at 12:09

## 2020-06-01 RX ADMIN — SODIUM CHLORIDE, SODIUM LACTATE, POTASSIUM CHLORIDE, AND CALCIUM CHLORIDE 1000 ML: 600; 310; 30; 20 INJECTION, SOLUTION INTRAVENOUS at 11:18

## 2020-06-01 RX ADMIN — PROMETHAZINE HYDROCHLORIDE 6.25 MG: 25 INJECTION INTRAMUSCULAR; INTRAVENOUS at 12:34

## 2020-06-01 RX ADMIN — PROPOFOL 200 MCG/KG/MIN: 10 INJECTION, EMULSION INTRAVENOUS at 12:11

## 2020-06-01 RX ADMIN — MIDAZOLAM 2 MG: 1 INJECTION INTRAMUSCULAR; INTRAVENOUS at 12:03

## 2020-06-01 RX ADMIN — MIDAZOLAM 1 MG: 1 INJECTION INTRAMUSCULAR; INTRAVENOUS at 12:10

## 2020-06-01 RX ADMIN — PROPOFOL 50 MG: 10 INJECTION, EMULSION INTRAVENOUS at 12:11

## 2020-06-01 NOTE — DISCHARGE INSTRUCTIONS
Gastrointestinal Esophagogastroduodenoscopy (EGD)/ Endoscopic Ultrasound(EUS)- Upper Exam Discharge Instructions    1. Call Dr. Kedar Dickinson  for any problems or questions. 2. Contact the doctor's office for follow up appointment as directed. 3. Medication may cause drowsiness for several hours, therefore, do not drive or operate machinery for remainder of the day. 4. No alcohol today. 5. Do not make any important decisions such as signing legal paperwork. 6. Ordinarily, you may resume regular diet and activity after exam unless otherwise specified by your physician. 7. For mild soreness in your throat you may use Cepacol throat lozenges or warm  salt-water gargles as needed. Any additional instructions:   Repeat procedure in 1 month         Instructions given to [employer] and other family members.

## 2020-06-01 NOTE — ROUTINE PROCESS
VSS. Discharge instructions reviewed with patient and Acosta Clark, wife and copy of instructions sent home with patient. Dr. Brando Sifuentes spoke with patient and wife prior to discharge. Questions answered. Discharged via wheelchair, wheeled out by Ferny Vasquez [facility]. IV discontinued prior to discharge. Personal items with patient at discharge: clothes

## 2020-06-01 NOTE — ANESTHESIA POSTPROCEDURE EVALUATION
Procedure(s):  ESOPHAGOGASTRODUODENOSCOPY (EGD)  BMI 30  ENDOSCOPIC ARGON PLASMA COAGULATION. total IV anesthesia    Anesthesia Post Evaluation      Multimodal analgesia: multimodal analgesia used between 6 hours prior to anesthesia start to PACU discharge  Patient location during evaluation: PACU  Patient participation: complete - patient participated  Level of consciousness: awake and alert  Pain management: adequate  Airway patency: patent  Anesthetic complications: no  Cardiovascular status: acceptable  Respiratory status: acceptable  Hydration status: acceptable  Post anesthesia nausea and vomiting:  none      INITIAL Post-op Vital signs:   Vitals Value Taken Time   /77 6/1/2020  1:04 PM   Temp 36.7 °C (98 °F) 6/1/2020 12:35 PM   Pulse 73 6/1/2020  1:03 PM   Resp 14 6/1/2020 12:48 PM   SpO2 98 % 6/1/2020  1:03 PM   Vitals shown include unvalidated device data.

## 2020-06-01 NOTE — H&P
[facility] H&P (Admission)        Date:  6/1/2020    Primary GI Physician:  Gt    Chief Complaint:  GI Bleed    Subjective:     History of Present Illness:  Patient is a 52 y.o. male with PMH of recurrent GI bleeding, who is being admitted for EGD with APC. PMH:  Past Medical History:   Diagnosis Date    Chronic obstructive pulmonary disease ([facility] [address]     early stages- 2 ppd smoker- quit 1/2020- used proair inhaler briefly- no current inhalers/treatments    Claustrophobia 03/22/2017    severe claustrophobia    Depression 3/22/2017    Fear of anesthetic     Fear of all anesthesia- multiple procedures since 1/2020--- states severe anxiety r/t anesthesia    Fear of awareness under general anesthesia     Fear of all anesthesia- multiple procedures since 1/2020--- states severe anxiety r/t anesthesia-    Fear of death 05/28/2020    Fear of all anesthesia/ \"I am afraid I am goign to die\" -- multiple procedures since 1/2020    Former smoker     quit 1/2020- 2 ppd since age 13    GAVE (gastric antral vascular ectasia)     anemia- FE iron- scheduled for PRBCs 5/28/2020 @ [facility] [place]- 29 units since 1/2020    GERD (gastroesophageal reflux disease) 3/22/2017    High cholesterol 3/22/2017    HNP (herniated nucleus pulposus), lumbar     Hypertension     states only high under anxiety-- no htn meds-- prn anxiety meds    Hypothyroidism     per [facility] Records    Iron deficiency anemia due to chronic blood loss     Hgb 8.2 5/26/2020 @ [facility]- 2 units PRBCs scheduled 5/28/2020 @ [facility] [place]- total 29 units since 1/2020    Lumbago     Lumbar radiculopathy     Migraines 3/22/2017    Obesity (BMI 30.0-34. 9)     BMI 30.87    PONV (postoperative nausea and vomiting)     Prostate disease     Scoliosis (and kyphoscoliosis), idiopathic     Severe anxiety     Thyroid disease 3/22/2017       PSH:  Past Surgical History:   Procedure Laterality Date    HX BACK SURGERY      HX COLONOSCOPY      HX ENDOSCOPY      HX ORCHIECTOMY      PROSTATE DISEASE / UNILATERAL    HX TONSILLECTOMY      SINUS SURGERY PROC UNLISTED      SINUS SURGERY       Allergies: Allergies   Allergen Reactions    Venom-Wasp Anaphylaxis     Bee sting will cause anaphylaxis  Bee sting will cause anaphylaxis      Levaquin [Levofloxacin] Hives     FLUOROQUINOLONES     Prednisone Unknown (comments)     CORTICOSTEROIDS \"LIQUID PREDNISONE\" only causes severe N/V       Home Medications:  Prior to Admission medications    Medication Sig Start Date End Date Taking? Authorizing Provider   levothyroxine (Synthroid) 150 mcg tablet Take 150 mcg by mouth Daily (before breakfast). Only takes brand name Synthroid   Yes Provider, Historical   CALCIUM PO Take  by mouth daily. Yes Provider, Historical   ascorbic acid, vitamin C, (VITAMIN C) 250 mg tablet 1 tab po daily   Yes Provider, Historical   cholecalciferol (VITAMIN D3) (5000 Units /125 mcg) capsule Take 5,000 Units by mouth daily. Yes Provider, Historical   ferrous sulfate 325 mg (65 mg iron) tablet Take  by mouth daily. Yes Provider, Historical   multivit-min-folic-vit K-lycop (One-A-Day Men's Multivitamin) 400- mcg tab Take 1 Tab by mouth daily. Yes Provider, Historical   Magnesium Oxide 500 mg cap Take 1 Tab by mouth daily. Yes Provider, Historical   conjugated estrogens (Premarin) 1.25 mg tablet Take 1.25 mg by mouth daily. Yes Provider, Historical   testosterone cypionate 200 mg/mL kit by IntraMUSCular route every fourteen (14) days. Yes Provider, Historical   sucralfate (Carafate) 1 gram tablet Take 1 g by mouth four (4) times daily (with meals). Yes Provider, Historical   pantoprazole (Protonix) 40 mg tablet Take 40 mg by mouth daily. Insurance will only pay for one time daily   Yes Provider, Historical   HYDROcodone-acetaminophen (NORCO)  mg tablet Take 1 Tab by mouth two (2) times daily as needed for Pain.  Indications: chronic back pain   Yes Provider, Historical   clonazePAM (KLONOPIN) 0.5 mg tablet Take 0.5 mg by mouth as needed for Anxiety. Yes Other, MD Frandy       Hospital Medications:  Current Facility-Administered Medications   Medication Dose Route Frequency    lactated Ringers infusion 1,000 mL  1,000 mL IntraVENous CONTINUOUS       Social History:  Social History     Tobacco Use    Smoking status: Former Smoker     Packs/day: 1.00     Years: 1.00     Pack years: 1.00     Last attempt to quit: 2020     Years since quittin.4    Smokeless tobacco: Never Used    Tobacco comment: TOBACCO ABUSE   Substance Use Topics    Alcohol use: Not Currently       Pt denies any history of drug use, tattoos, or blood transfusions. Family History:  Family History   Problem Relation Age of Onset    Hypertension Other     Stroke Other     Stroke Mother         cerebral aneurysm    Prostate Cancer Father        Review of Systems:  A detailed 10 system ROS is obtained, with pertinent positives as listed above. All others are negative. Objective:     Physical Exam:  Vitals:  Visit Vitals  /68   Pulse 71   Temp 98.2 °F (36.8 °C)   Resp 14   Ht 5' 8.5\" (1.74 m)   Wt 93.4 kg (206 lb)   SpO2 100%   BMI 30.87 kg/m²     Gen:  Pt is alert, cooperative, no acute distress  Skin:  Extremities and face reveal no rashes. HEENT: Sclerae anicteric. Extra-occular muscles are intact. No oral ulcers. No abnormal pigmentation of the lips. The neck is supple. Cardiovascular: Regular rate and rhythm. No murmurs, gallops, or rubs. Respiratory:  Comfortable breathing with no accessory muscle use. Clear breath sounds with no wheezes, rales, or rhonchi. GI:  Abdomen nondistended, soft, and nontender. Normal active bowel sounds. No enlargement of the liver or spleen. No masses palpable. Rectal:  Deferred  Musculoskeletal:  No pitting edema of the lower legs. Neurological:  Gross memory appears intact. Patient is alert and oriented.   Psychiatric:  Mood appears appropriate with judgement intact. Lymphatic:  No cervical or supraclavicular adenopathy. Laboratory:    No results for input(s): WBC, HGB, HCT, PLT, MCV, NA, K, CL, CO2, BUN, CREA, CA, MG, GLU, AP, ALT, TBIL, TBILI, CBIL, ALB, TP, AML, LPSE, PTP, INR, APTT, HGBEXT, HCTEXT, PLTEXT, INREXT in the last 72 hours. No lab exists for component: SGOT, GPT, DBIL    Assessment:       Active Problems:    * No active hospital problems.  *      Plan:       GAVE- Plan EGD and APC

## 2020-06-01 NOTE — OP NOTES
Esophagogastroduodenoscopy    DATE of PROCEDURE: 6/1/2020    INDICATION: GI Bleed    POSTPROCEDURE DIAGNOSIS:  GAVE    MEDICATIONS ADMINISTERED: MAC anesthesia (see anesthesia report)    INSTRUMENT:    PROCEDURE:  After obtaining informed consent, the patient was placed in the left lateral position and sedated. The endoscope was advanced under direct vision without difficulty. The esophagus, stomach (including retroflexed views) and duodenum were evaluated. The patient was taken to the recovery area in stable condition.     FINDINGS:  ESOPHAGUS: Normal  STOMACH: Diffuse friable gastritis, most consistent with GAVE treated with APC (30W, 8L )  DUODENUM: Normal    Estimated blood loss: 0-minimal   Specimens obtained during procedure: none    PLAN: Serial Hgb, Rpt APC 1 month

## 2020-06-01 NOTE — ANESTHESIA PREPROCEDURE EVALUATION
Relevant Problems   No relevant active problems       Anesthetic History     PONV          Review of Systems / Medical History  Patient summary reviewed and pertinent labs reviewed    Pulmonary    COPD      Smoker (Former)         Neuro/Psych         Psychiatric history (Depression)     Cardiovascular    Hypertension          Hyperlipidemia    Exercise tolerance: >4 METS     GI/Hepatic/Renal     GERD          Comments: GI Bleed Endo/Other      Hyperthyroidism  Obesity     Other Findings              Physical Exam    Airway  Mallampati: II  TM Distance: > 6 cm  Neck ROM: normal range of motion   Mouth opening: Normal     Cardiovascular  Regular rate and rhythm,  S1 and S2 normal,  no murmur, click, rub, or gallop             Dental  No notable dental hx       Pulmonary  Breath sounds clear to auscultation               Abdominal         Other Findings            Anesthetic Plan    ASA: 2  Anesthesia type: total IV anesthesia            Anesthetic plan and risks discussed with: Patient

## 2020-10-13 NOTE — PROGRESS NOTES
Patient confirmed new procedure time for 0930 and arrival time for 0800. Dr. Kishor Hayes informed of adjusted procedure time.

## 2020-10-14 ENCOUNTER — ANESTHESIA EVENT (OUTPATIENT)
Dept: ENDOSCOPY | Age: 50
End: 2020-10-14
Payer: COMMERCIAL

## 2020-10-14 ENCOUNTER — HOSPITAL ENCOUNTER (OUTPATIENT)
Age: 50
Setting detail: OUTPATIENT SURGERY
Discharge: HOME | End: 2020-10-14
Attending: INTERNAL MEDICINE | Admitting: INTERNAL MEDICINE
Payer: COMMERCIAL

## 2020-10-14 ENCOUNTER — ANESTHESIA (OUTPATIENT)
Dept: ENDOSCOPY | Age: 50
End: 2020-10-14
Payer: COMMERCIAL

## 2020-10-14 VITALS
DIASTOLIC BLOOD PRESSURE: 55 MMHG | OXYGEN SATURATION: 99 % | BODY MASS INDEX: 34.1 KG/M2 | HEART RATE: 73 BPM | RESPIRATION RATE: 16 BRPM | WEIGHT: 225 LBS | SYSTOLIC BLOOD PRESSURE: 115 MMHG | TEMPERATURE: 98.3 F | HEIGHT: 68 IN

## 2020-10-14 PROCEDURE — 74011250636 HC RX REV CODE- 250/636: Performed by: ANESTHESIOLOGY

## 2020-10-14 PROCEDURE — 76040000025: Performed by: INTERNAL MEDICINE

## 2020-10-14 PROCEDURE — 74011000250 HC RX REV CODE- 250: Performed by: NURSE ANESTHETIST, CERTIFIED REGISTERED

## 2020-10-14 PROCEDURE — 77030022875 HC PRB AR PLSM COAG ERBE -C: Performed by: INTERNAL MEDICINE

## 2020-10-14 PROCEDURE — 2709999900 HC NON-CHARGEABLE SUPPLY: Performed by: INTERNAL MEDICINE

## 2020-10-14 PROCEDURE — 76060000032 HC ANESTHESIA 0.5 TO 1 HR: Performed by: INTERNAL MEDICINE

## 2020-10-14 PROCEDURE — 74011250636 HC RX REV CODE- 250/636: Performed by: NURSE ANESTHETIST, CERTIFIED REGISTERED

## 2020-10-14 RX ORDER — PROPOFOL 10 MG/ML
INJECTION, EMULSION INTRAVENOUS AS NEEDED
Status: DISCONTINUED | OUTPATIENT
Start: 2020-10-14 | End: 2020-10-14 | Stop reason: HOSPADM

## 2020-10-14 RX ORDER — MIDAZOLAM HYDROCHLORIDE 1 MG/ML
INJECTION, SOLUTION INTRAMUSCULAR; INTRAVENOUS
Status: ACTIVE
Start: 2020-10-14 | End: 2020-10-14

## 2020-10-14 RX ORDER — PROPOFOL 10 MG/ML
INJECTION, EMULSION INTRAVENOUS
Status: DISCONTINUED | OUTPATIENT
Start: 2020-10-14 | End: 2020-10-14 | Stop reason: HOSPADM

## 2020-10-14 RX ORDER — LIDOCAINE HYDROCHLORIDE 20 MG/ML
INJECTION, SOLUTION EPIDURAL; INFILTRATION; INTRACAUDAL; PERINEURAL AS NEEDED
Status: DISCONTINUED | OUTPATIENT
Start: 2020-10-14 | End: 2020-10-14 | Stop reason: HOSPADM

## 2020-10-14 RX ORDER — MIDAZOLAM HYDROCHLORIDE 1 MG/ML
INJECTION, SOLUTION INTRAMUSCULAR; INTRAVENOUS AS NEEDED
Status: DISCONTINUED | OUTPATIENT
Start: 2020-10-14 | End: 2020-10-14 | Stop reason: HOSPADM

## 2020-10-14 RX ORDER — SODIUM CHLORIDE, SODIUM LACTATE, POTASSIUM CHLORIDE, CALCIUM CHLORIDE 600; 310; 30; 20 MG/100ML; MG/100ML; MG/100ML; MG/100ML
1000 INJECTION, SOLUTION INTRAVENOUS CONTINUOUS
Status: DISCONTINUED | OUTPATIENT
Start: 2020-10-14 | End: 2020-10-14 | Stop reason: HOSPADM

## 2020-10-14 RX ORDER — ONDANSETRON 2 MG/ML
4-8 INJECTION INTRAMUSCULAR; INTRAVENOUS
Status: DISCONTINUED | OUTPATIENT
Start: 2020-10-14 | End: 2020-10-14 | Stop reason: HOSPADM

## 2020-10-14 RX ORDER — PROMETHAZINE HYDROCHLORIDE 25 MG/ML
INJECTION, SOLUTION INTRAMUSCULAR; INTRAVENOUS AS NEEDED
Status: DISCONTINUED | OUTPATIENT
Start: 2020-10-14 | End: 2020-10-14 | Stop reason: HOSPADM

## 2020-10-14 RX ADMIN — MIDAZOLAM 2 MG: 1 INJECTION INTRAMUSCULAR; INTRAVENOUS at 09:39

## 2020-10-14 RX ADMIN — PROPOFOL 20 MG: 10 INJECTION, EMULSION INTRAVENOUS at 09:50

## 2020-10-14 RX ADMIN — PROMETHAZINE HYDROCHLORIDE 6.25 MG: 25 INJECTION INTRAMUSCULAR; INTRAVENOUS at 09:42

## 2020-10-14 RX ADMIN — ONDANSETRON 4 MG: 2 INJECTION INTRAMUSCULAR; INTRAVENOUS at 08:38

## 2020-10-14 RX ADMIN — PROPOFOL 50 MCG/KG/MIN: 10 INJECTION, EMULSION INTRAVENOUS at 09:42

## 2020-10-14 RX ADMIN — SODIUM CHLORIDE, SODIUM LACTATE, POTASSIUM CHLORIDE, AND CALCIUM CHLORIDE 1000 ML: 600; 310; 30; 20 INJECTION, SOLUTION INTRAVENOUS at 08:38

## 2020-10-14 RX ADMIN — MIDAZOLAM 1 MG: 1 INJECTION INTRAMUSCULAR; INTRAVENOUS at 09:42

## 2020-10-14 RX ADMIN — LIDOCAINE HYDROCHLORIDE 25 MG: 20 INJECTION, SOLUTION EPIDURAL; INFILTRATION; INTRACAUDAL; PERINEURAL at 09:42

## 2020-10-14 NOTE — H&P
[facility] H&P (Admission)        Date:  10/14/2020    Primary GI Physician:  Barbara    Chief Complaint:  bleeding    Subjective:     History of Present Illness:  Patient is a 48 y.o. 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 PMH:  Past Medical History:   Diagnosis Date    Anxiety associated with depression     Chronic obstructive pulmonary disease ([address]     early stages- 2 ppd smoker- quit 1/2020- used proair inhaler briefly- no current inhalers/treatments    Claustrophobia 03/22/2017    severe claustrophobia    Fear of anesthetic     Fear of all anesthesia- multiple procedures since 1/2020--- states severe anxiety r/t anesthesia    Fear of awareness under general anesthesia     Fear of all anesthesia- multiple procedures since 1/2020--- states severe anxiety r/t anesthesia-    Fear of death 05/28/2020    Fear of all anesthesia/ \"I am afraid I am goign to die\" -- multiple procedures since 1/2020    Former smoker     quit 1/2020- 2 ppd since age 13    GAVE (gastric antral vascular ectasia)     anemia- FE iron- scheduled for PRBCs 5/28/2020 @ [facility]- 29 units since 1/2020    GERD (gastroesophageal reflux disease) 3/22/2017    High cholesterol 03/22/2017    managed with meds    HNP (herniated nucleus pulposus), lumbar     Hypertension     states only high under anxiety-- no htn meds-- prn anxiety meds    Hypothyroidism     per [facility] Records    Iron deficiency anemia due to chronic blood loss     Hgb 8.2 5/26/2020 @ [facility]- 2 units PRBCs scheduled 5/28/2020 @ [facility] [place]- total 29 units since 1/2020    Lumbago     Lumbar radiculopathy     Migraines 3/22/2017    Obesity (BMI 30.0-34. 9)     BMI 30.87    PONV (postoperative nausea and vomiting)     Prostate disease     Scoliosis (and kyphoscoliosis), idiopathic        PSH:  Past Surgical History:   Procedure Laterality Date    HX BACK SURGERY  HX COLONOSCOPY      HX ENDOSCOPY      HX ORCHIECTOMY      PROSTATE DISEASE / UNILATERAL    HX TONSILLECTOMY      SINUS SURGERY PROC UNLISTED      SINUS SURGERY       Allergies: Allergies   Allergen Reactions    Venom-Wasp Anaphylaxis     Bee sting will cause anaphylaxis  Bee sting will cause anaphylaxis      Levaquin [Levofloxacin] Hives     FLUOROQUINOLONES     Prednisone Unknown (comments)     CORTICOSTEROIDS \"LIQUID PREDNISONE\" only causes severe N/V       Home Medications:  Prior to Admission medications    Medication Sig Start Date End Date Taking? Authorizing Provider   levothyroxine (Synthroid) 150 mcg tablet Take 150 mcg by mouth Daily (before breakfast). Only takes brand name Synthroid   Yes Provider, Historical   CALCIUM PO Take  by mouth daily. Yes Provider, Historical   ascorbic acid, vitamin C, (VITAMIN C) 250 mg tablet 1 tab po daily   Yes Provider, Historical   cholecalciferol (VITAMIN D3) (5000 Units /125 mcg) capsule Take 5,000 Units by mouth daily. Yes Provider, Historical   ferrous sulfate 325 mg (65 mg iron) tablet Take  by mouth daily. Yes Provider, Historical   multivit-min-folic-vit K-lycop (One-A-Day Men's Multivitamin) 400- mcg tab Take 1 Tab by mouth daily. Yes Provider, Historical   Magnesium Oxide 500 mg cap Take 1 Tab by mouth daily. Yes Provider, Historical   conjugated estrogens (Premarin) 1.25 mg tablet Take 1.25 mg by mouth daily. Yes Provider, Historical   testosterone cypionate 200 mg/mL kit by IntraMUSCular route every fourteen (14) days. Yes Provider, Historical   sucralfate (Carafate) 1 gram tablet Take 1 g by mouth four (4) times daily (with meals). Yes Provider, Historical   pantoprazole (Protonix) 40 mg tablet Take 40 mg by mouth daily. Insurance will only pay for one time daily   Yes Provider, Historical   HYDROcodone-acetaminophen (NORCO)  mg tablet Take 1 Tab by mouth two (2) times daily as needed for Pain.  Indications: chronic back pain   Yes Provider, Historical   clonazePAM (KLONOPIN) 0.5 mg tablet Take 0.5 mg by mouth as needed for Anxiety. Yes Other, MD Frandy       Hospital Medications:  Current Facility-Administered Medications   Medication Dose Route Frequency    ondansetron (ZOFRAN) injection 4-8 mg  4-8 mg IntraVENous Multiple    lactated Ringers infusion 1,000 mL  1,000 mL IntraVENous CONTINUOUS       Social History:  Social History     Tobacco Use    Smoking status: Former Smoker     Packs/day: 1.00     Years: 1.00     Pack years: 1.00     Last attempt to quit: 2020     Years since quittin.7    Smokeless tobacco: Never Used    Tobacco comment: TOBACCO ABUSE   Substance Use Topics    Alcohol use: Not Currently       Pt denies any history of drug use, tattoos, or blood transfusions. Family History:  Family History   Problem Relation Age of Onset    Hypertension Other     Stroke Other     Stroke Mother         cerebral aneurysm    Prostate Cancer Father        Review of Systems:  A detailed 10 system ROS is obtained, with pertinent positives as listed above. All others are negative. Objective:     Physical Exam:  Vitals:  Visit Vitals  BP (!) 174/80   Pulse 76   Temp 98.3 °F (36.8 °C)   Resp 18   Ht 5' 8\" (1.727 m)   Wt 102.1 kg (225 lb)   SpO2 99%   BMI 34.21 kg/m²     Gen:  Pt is alert, cooperative, no acute distress  Skin:  Extremities and face reveal no rashes. HEENT: Sclerae anicteric. Extra-occular muscles are intact. No oral ulcers. No abnormal pigmentation of the lips. The neck is supple. Cardiovascular: Regular rate and rhythm. No murmurs, gallops, or rubs. Respiratory:  Comfortable breathing with no accessory muscle use. Clear breath sounds with no wheezes, rales, or rhonchi. GI:  Abdomen nondistended, soft, and nontender. Normal active bowel sounds. No enlargement of the liver or spleen. No masses palpable. Rectal:  Deferred  Musculoskeletal:  No pitting edema of the lower legs. Neurological:  Gross memory appears intact. Patient is alert and oriented. Psychiatric:  Mood appears appropriate with judgement intact. Lymphatic:  No cervical or supraclavicular adenopathy. Laboratory:    No results for input(s): WBC, HGB, HCT, PLT, MCV, NA, K, CL, CO2, BUN, CREA, CA, MG, GLU, AP, ALT, TBIL, TBILI, CBIL, ALB, TP, AML, LPSE, PTP, INR, APTT, HGBEXT, HCTEXT, PLTEXT, INREXT in the last 72 hours. No lab exists for component: SGOT, GPT, DBIL    Assessment:       Active Problems:    * No active hospital problems. *      Plan:       Upper GI bleed- plan for EGD with possible APC.

## 2020-10-14 NOTE — OP NOTES
Esophagogastroduodenoscopy    DATE of PROCEDURE: 10/14/2020    INDICATION: Upper GI Bleed    POSTPROCEDURE DIAGNOSIS: Atypical GAVE    MEDICATIONS ADMINISTERED: MAC anesthesia (see anesthesia report)    INSTRUMENT: [id number]    PROCEDURE:  After obtaining informed consent, the patient was placed in the left lateral position and sedated. The endoscope was advanced under direct vision without difficulty. The esophagus, stomach (including retroflexed views) and duodenum were evaluated. The patient was taken to the recovery area in stable condition.     FINDINGS:  ESOPHAGUS: Normal  STOMACH: OOzing congested stomach, treated with APC: 0.8L flow, 40 [address]  DUODENUM: Normal    Estimated blood loss: 0-minimal   Specimens obtained during procedure: none    PLAN: Repeat EGD 4-6 weeks

## 2020-10-14 NOTE — ROUTINE PROCESS
VSS. Discharge instructions reviewed with patient and Chacha Scanlont, wife and copy of instructions sent home with patient. Dr. Haylee Rios spoke with patient and wife prior to discharge. Questions answered. Discharged via wheelchair, wheeled out by 2 [address] [place] [address]. IV discontinued prior to discharge. Personal items with patient at discharge: clothes, mask

## 2020-10-14 NOTE — ANESTHESIA POSTPROCEDURE EVALUATION
Procedure(s):  ESOPHAGOGASTRODUODENOSCOPY (EGD) / 34  ENDOSCOPIC ARGON PLASMA COAGULATION. total IV anesthesia    Anesthesia Post Evaluation      Multimodal analgesia: multimodal analgesia used between 6 hours prior to anesthesia start to PACU discharge  Patient location during evaluation: PACU  Patient participation: complete - patient participated  Level of consciousness: awake and alert  Pain management: adequate  Airway patency: patent  Anesthetic complications: no  Cardiovascular status: acceptable  Respiratory status: acceptable  Hydration status: acceptable  Post anesthesia nausea and vomiting:  none  Final Post Anesthesia Temperature Assessment:  Normothermia (36.0-37.5 degrees C)      INITIAL Post-op Vital signs:   Vitals Value Taken Time   /57 10/14/2020 10:27 AM   Temp     Pulse 71 10/14/2020 10:28 AM   Resp 16 10/14/2020 10:27 AM   SpO2 98 % 10/14/2020 10:28 AM   Vitals shown include unvalidated device data.

## 2020-10-14 NOTE — DISCHARGE INSTRUCTIONS
Gastrointestinal Esophagogastroduodenoscopy (EGD)/ Endoscopic Ultrasound(EUS)- Upper Exam Discharge Instructions    1. Call Dr. Enid Jacob  for any problems or questions. 2. Contact the doctor's office for follow up appointment as directed. 3. Medication may cause drowsiness for several hours, therefore, do not drive or operate machinery for remainder of the day. 4. No alcohol today. 5. Do not make any important decisions such as signing legal paperwork. 6. Ordinarily, you may resume regular diet and activity after exam unless otherwise specified by your physician. 7. For mild soreness in your throat you may use Cepacol throat lozenges or warm  salt-water gargles as needed. Any additional instructions:   Repeat EGD in 4-6 weeks         Instructions given to Rudy Jovel and other family members.

## (undated) DEVICE — CANNULA NSL ORAL AD FOR CAPNOFLEX CO2 O2 AIRLFE

## (undated) DEVICE — FIAPC® PROBE W/ FILTER 2200 C OD 2.3MM/6.9FR; L 2.2M/7.2FT: Brand: ERBE

## (undated) DEVICE — REM POLYHESIVE ADULT PATIENT RETURN ELECTRODE: Brand: VALLEYLAB

## (undated) DEVICE — MEDI-VAC YANKAUER SUCTION HANDLE W/BULBOUS TIP: Brand: CARDINAL HEALTH

## (undated) DEVICE — KENDALL RADIOLUCENT FOAM MONITORING ELECTRODE RECTANGULAR SHAPE: Brand: KENDALL

## (undated) DEVICE — CONNECTOR TBNG OD5-7MM O2 END DISP

## (undated) DEVICE — FIAPC® PROBE W/ FILTER 2200 A OD 2.3MM/6.9FR; L 2.2M/7.2FT: Brand: ERBE

## (undated) DEVICE — FORCEPS BX L240CM JAW DIA2.8MM L CAP W/ NDL MIC MESH TOOTH

## (undated) DEVICE — CONTAINER PREFIL FRMLN 40ML --

## (undated) DEVICE — BLOCK BITE AD 60FR W/ VELC STRP ADDRESSES MOST PT AND

## (undated) DEVICE — 1200 GUARD II KIT W/5MM TUBE W/O VAC TUBE: Brand: GUARDIAN